# Patient Record
Sex: FEMALE | Race: AMERICAN INDIAN OR ALASKA NATIVE | Employment: FULL TIME | URBAN - METROPOLITAN AREA
[De-identification: names, ages, dates, MRNs, and addresses within clinical notes are randomized per-mention and may not be internally consistent; named-entity substitution may affect disease eponyms.]

---

## 2019-01-21 ENCOUNTER — HOSPITAL ENCOUNTER (OUTPATIENT)
Dept: PREADMISSION TESTING | Age: 54
Discharge: HOME | End: 2019-01-21
Payer: COMMERCIAL

## 2019-01-21 VITALS — BODY MASS INDEX: 34.55 KG/M2 | HEIGHT: 61 IN | WEIGHT: 183 LBS

## 2019-01-21 LAB
ALBUMIN SERPL-MCNC: 3.8 G/DL (ref 3.4–5)
ALBUMIN/GLOB SERPL: 1 {RATIO} (ref 0.8–1.7)
ALP SERPL-CCNC: 140 U/L (ref 45–117)
ALT SERPL-CCNC: 54 U/L (ref 13–56)
ANION GAP SERPL CALC-SCNC: 7 MMOL/L (ref 3–18)
AST SERPL-CCNC: 46 U/L (ref 15–37)
BACTERIA SPEC CULT: NORMAL
BILIRUB SERPL-MCNC: 0.5 MG/DL (ref 0.2–1)
BUN SERPL-MCNC: 8 MG/DL (ref 7–18)
BUN/CREAT SERPL: 11 (ref 12–20)
CALCIUM SERPL-MCNC: 9 MG/DL (ref 8.5–10.1)
CHLORIDE SERPL-SCNC: 100 MMOL/L (ref 100–108)
CO2 SERPL-SCNC: 28 MMOL/L (ref 21–32)
CREAT SERPL-MCNC: 0.71 MG/DL (ref 0.6–1.3)
ERYTHROCYTE [DISTWIDTH] IN BLOOD BY AUTOMATED COUNT: 12.9 % (ref 11.6–14.5)
EST. AVERAGE GLUCOSE BLD GHB EST-MCNC: 260 MG/DL
GLOBULIN SER CALC-MCNC: 3.8 G/DL (ref 2–4)
GLUCOSE SERPL-MCNC: 212 MG/DL (ref 74–99)
HBA1C MFR BLD: 10.7 % (ref 4.2–5.6)
HCT VFR BLD AUTO: 42.1 % (ref 35–45)
HGB BLD-MCNC: 13.7 G/DL (ref 12–16)
MCH RBC QN AUTO: 29.2 PG (ref 24–34)
MCHC RBC AUTO-ENTMCNC: 32.5 G/DL (ref 31–37)
MCV RBC AUTO: 89.8 FL (ref 74–97)
PLATELET # BLD AUTO: 210 K/UL (ref 135–420)
PMV BLD AUTO: 12.5 FL (ref 9.2–11.8)
POTASSIUM SERPL-SCNC: 4.2 MMOL/L (ref 3.5–5.5)
PROT SERPL-MCNC: 7.6 G/DL (ref 6.4–8.2)
RBC # BLD AUTO: 4.69 M/UL (ref 4.2–5.3)
SERVICE CMNT-IMP: NORMAL
SODIUM SERPL-SCNC: 135 MMOL/L (ref 136–145)
WBC # BLD AUTO: 6 K/UL (ref 4.6–13.2)

## 2019-01-21 PROCEDURE — 83036 HEMOGLOBIN GLYCOSYLATED A1C: CPT

## 2019-01-21 PROCEDURE — 87641 MR-STAPH DNA AMP PROBE: CPT

## 2019-01-21 PROCEDURE — 80053 COMPREHEN METABOLIC PANEL: CPT

## 2019-01-21 PROCEDURE — 93005 ELECTROCARDIOGRAM TRACING: CPT

## 2019-01-21 PROCEDURE — 85027 COMPLETE CBC AUTOMATED: CPT

## 2019-01-21 RX ORDER — PERPHENAZINE 16 MG
2 TABLET ORAL 2 TIMES DAILY
COMMUNITY

## 2019-01-21 RX ORDER — OMEPRAZOLE 20 MG/1
20 CAPSULE, DELAYED RELEASE ORAL DAILY
COMMUNITY

## 2019-01-21 RX ORDER — CEFAZOLIN SODIUM 2 G/50ML
2 SOLUTION INTRAVENOUS ONCE
Status: CANCELLED | OUTPATIENT
Start: 2019-02-05 | End: 2019-02-05

## 2019-01-21 RX ORDER — GABAPENTIN 100 MG/1
100 CAPSULE ORAL
COMMUNITY

## 2019-01-21 RX ORDER — ATORVASTATIN CALCIUM 20 MG/1
20 TABLET, FILM COATED ORAL DAILY
COMMUNITY

## 2019-01-21 RX ORDER — SODIUM CHLORIDE, SODIUM LACTATE, POTASSIUM CHLORIDE, CALCIUM CHLORIDE 600; 310; 30; 20 MG/100ML; MG/100ML; MG/100ML; MG/100ML
125 INJECTION, SOLUTION INTRAVENOUS CONTINUOUS
Status: CANCELLED | OUTPATIENT
Start: 2019-01-21

## 2019-01-21 NOTE — PERIOP NOTES
Patient aware and confirms she will get instructions on the use of her pump insulin from Dr. Randy Hwang before the surgery.

## 2019-01-22 LAB
ATRIAL RATE: 69 BPM
CALCULATED P AXIS, ECG09: 63 DEGREES
CALCULATED R AXIS, ECG10: -2 DEGREES
CALCULATED T AXIS, ECG11: 24 DEGREES
DIAGNOSIS, 93000: NORMAL
P-R INTERVAL, ECG05: 148 MS
Q-T INTERVAL, ECG07: 424 MS
QRS DURATION, ECG06: 82 MS
QTC CALCULATION (BEZET), ECG08: 454 MS
VENTRICULAR RATE, ECG03: 69 BPM

## 2019-01-28 NOTE — PERIOP NOTES
Endocrinology note reviewed and email sent to THE [facility] regarding being involved in patient's care while in hospital.

## 2019-01-28 NOTE — DIABETES MGMT
GLYCEMIC CONTROL PROGRESS NOTE: 
 
- Left message for pt to return call regarding hospital policies and procedures regarding home insulin pump use during surgery and hospitalization. Pt scheduled for decompression and fusion surgery with Dr. Ivan Nielsen on February 5, 2019. 
-left message on both mobile number and work number 
 
Vish Leary MS, RN, CDE Glycemic Control Team 
[phone] Pager [phone] (M-TH 8:00-4:30P) *After Hours pager [phone]

## 2019-01-28 NOTE — H&P
Patient Name:  Darell Medina 
YOB: 1965 Chief Complaint:  Right-sided lower back pain and right lower extremity pain. History of Chief Complaint:  Ms. Clemente Castro is a 79-year-old female who is being seen for right-sided lower back pain and right lower extremity pain. She occasionally has pain in the left leg. She has had these symptoms for six months after falling. She was seen at [facility] and had a CT scan. They suggested lumbar fusion. She would have to remove her bladder stimulator to have an MRI, and she seemed uncertain. The pain is constant. She has numbness in the bilateral lower extremities intermittently. There is no weakness. She occasionally has problems with her balance and says she is unable to do anything. She is able to walk less than one block. The pain is worse with climbing stairs and standing. She is unable to sleep. The patient has had no chiropractic care. Physical therapy was scheduled, but she did not attend as it was too expensive. She has had no injections. She has taken ibuprofen and Tylenol Arthritis to help with pain relief. She had lumbar spine surgery in 2011. She had a CT scan done at [facility] in November 2017. She did undergo a right PSIS injection on 06/18/18. She did get some relief with the injection, but she states all of a sudden she had a severe increase in her pain along lower back, right side worse than left, and down the right leg into her foot. She states that the PSIS injection only helped briefly. Since this sudden pain here recently she has had inability to do all of her activities of daily living. It is very painful for her to stand, bend, squat, or sit. She has been limping. She is unable to stand over 10 to 15 minutes at a time. She has been having spasms at night, especially in her back and her leg. She has been unable to sleep due to the pain and spasms. The more walking she does the more painful it is.  She has only been able to walk a half of a block at a time. She states her groin pain has been increasing significantly lately. She denies any numbness, weakness, or incontinence. She occasionally has some loss of balance upon standing. She says the epidurals do not really last very long, but because of her diabetes it also increases her blood sugars. She is quite worried about that. The epidural shots that she did have seemed to make the pain in her leg better for a couple of days. Past Medical/Surgical History:   
Disease/Disorder Type Date Side Surgery Date Side Comment Arthritis Depression Diabetes mellitus    Bladder stimulator implant Sleep apnea Surgery, lumbar spine 2011  Laminectomy L5, no hardware. Rotator cuff repair 2015 Carpal tunnel release 2017 bilateral   
 
Allergies:   
Ingredient Reaction Medication Name Comment MORPHINE throat swelling CODEINE-Percocet nausea/vomiting SULFA (SULFONAMIDE ANTIBIOTICS) TRAMADOL Jenita Rina Cipro Nausea / Vomiting Current Medications:   
Medication Directions  
sertraline 100 mg tablet take 1 tablet by oral route  every day Humulin R U-500 (Concentrated) Kwikpen 500 unit/mL (3 mL) subcutaneous inject by subcutaneous route as per insulin protocol Jardiance 25 mg tablet take 1 tablet by oral route  every day  
omeprazole take once a day Social History: SMOKING Status Tobacco Type Units Per Day Yrs Used Never smoker ALCOHOL There is no history of alcohol use. Family History:   
Disease Detail Family Member Age Cause of Death Comments Family history of Problem with anesthesia   N Asthma Father  N   
Cardiovascular disease Father  N Hypertension Father  N Cancer, bladder Mother  N Cancer, lung Father  N Diabetes mellitus Father  N Review of Systems:    Pertinent positives include joint pain, joint stiffness and numbness/tingling. Pertinent negatives include chills, fever, weight change, loss of balance and muscle weakness. Vitals: 
Date BP Pulse Temp (F) Resp. (per min.) Height (Total in.) Weight (lbs.) BMI  
11/14/2018     60.00  36.52  
01/29/2018     60.00  36.52 Physical Examination:  
Heart- RRR Lungs-CTA sonal Abdomen- +BS,soft,nontender Musculoskeletal:  There is mild tenderness around the paravertebral spinal muscles. Otherwise, the thoracolumbar spine has normal kyphosis, a normal appearance, and no scoliosis. There is full range of motion of the cervical, thoracic, and lumbar spine and of the hips, knees, and ankles. Straight leg raising and crossed straight leg raising tests are negative. Neurological:  There is no weakness in the thoracic, lumbar, or sacral spine or in the lower extremities or hips. Deep tendon reflexes are present and normal bilaterally. Ankle and knee jerks are normal with no clonus. Radiograph Examination:  AP, lateral, bilateral oblique, flexion and extension views of the lumbar spine were obtained and interpreted in the office 1/29/18 and reveal severe degenerative disc disease at L5-S1. An AP view of the pelvis was obtained and interpreted in the office  and reveals right hip osteoarthritis more than left hip osteoarthritis. Review of her CT scan [facility] 1/14/19 reveals L3 to S1 severe degenerative disc disease and spinal stenosis. Plan:  Ms. Gt Jarvis and EARL had a long discussion about the treatments for her lumbar spinal stenosis and degenerative disc disease including surgical intervention, the risks, and benefits as well as the different surgical approaches and decision making. We also discussed nonsurgical care for this condition including medications, injections, physical therapy, rehabilitation, activity modification, and brace utilization.  At this point, she would like to proceed with operative intervention having failed conservative care. We will plan for L3 to S1 decompression and fusion . The risks versus the benefits as well as the alternatives were fully explained to the patient. Risks include, but are not limited to, paralysis, death, heart attack, stroke, pulmonary embolism, deep vein thrombosis, infection, failure to relieve pain, increase in back or leg pain, reherniation of disc material, need for revision surgery, scarring, spinal fluid leak, bowel or bladder dysfunction, disease transmission, chronic graft site pain, instrumentation failure, pseudarthrosis, and the need for chronic ambulatory assist devices. The patient states full understanding of the risks and benefits and wishes to proceed.

## 2019-01-29 PROBLEM — M48.062 SPINAL STENOSIS OF LUMBAR REGION WITH NEUROGENIC CLAUDICATION: Status: ACTIVE | Noted: 2019-01-29

## 2019-01-29 PROBLEM — M51.26 HNP (HERNIATED NUCLEUS PULPOSUS), LUMBAR: Status: ACTIVE | Noted: 2019-01-29

## 2019-01-29 PROBLEM — M51.36 DDD (DEGENERATIVE DISC DISEASE), LUMBAR: Status: ACTIVE | Noted: 2019-01-29

## 2019-01-30 NOTE — DIABETES MGMT
INSULIN PUMP CONSULT/ Plan Of Care How long have you had diabetes? T2DM since 1990 How long have you had an insulin pump? Where did you learn how to use it? Endocrinologist office What is your blood glucose target? < 200 mg/dL How often do you usually test your blood glucose in a day? At least 4x/day What was your most recent A1C and date? January 2019 10.7% Who is your endocrinologist?      Dr. Haris Bañuelos When was your last visit to your endocrinologist?   January 2019 INSULIN PUMP Brand of pump and model #:   
Type of insulin used   U-500 insulin (5 x more concentrated than U-100) Type of infusion set   Does not know What are your basal rate(s)? 2.29 units/hr What is your sensitivity factor? 80 What is your insulin to carbohydrate ratio? 1:14 What is your total daily dose? Basal = 55 units Bolus = 200-300 units What conventional insulin dose do you use if you pump were inoperable? (\"off pump plan\") Do you often have hypoglycemia? Per Endocrinologist note & CGM data pt is NOT having any hypoglycemic events; pt reports feeling hypo when  mg/dL How do you treat hypoglycemia? Do you have any site problems?     no 
Do you feel able and confident to manage your pump while here? yes Who helps you manage your insulin pump when you are not able? Will follow up - pt returned call to this writer for insulin pump consult, during call pt needed to respond to work priorities unable to complete consult, including a review of the policies for the safe use of insulin pumps at THE [facility]; this writer able to speak with patient at later time and review THE [facility] policies Spoke with pt via phone for insulin pump consult r/t upcoming surgery scheduled with Dr. Kiel Kidd on 02/05/19.  The following topics were thoroughly discussed: 
   
Per THE [facility] policy for patient safety, the use of home insulin pumps is not permitted during surgery or diagnostic procedures (i.e. xray, ultrasound, MRI, etc). Per Dr. Randy Hwang recommendation you will be able to wear your pump during your surgical procedure. You will be able to bolus yourself if needed prior to going into surgery. Your blood sugar will be checked during the entire pre-op process and during the operation. Please bring extra of all supplies needed, including insulin. We ask that you communicate with the nursing staff at all times regarding your pump and notify your nurse before you bolus at any time so that the amount of insulin being delivered can be noted in your chart. Please come prepared to report your basal rates to your nurse as well as your carb ratio and your sensitivity factor. The pump agreement also states that if at any time it becomes unsafe for you to continue wearing your pump, you will agree to remove the pump if asked so by your nurse or doctor and let our medical team handle your insulin delivery via sub-q injection. Examples of this would be a change in your level of consciousness or level of awareness or an inability for you to competently operate the pump. If you have a family member that is able to operate your pump on your behalf they may do so if they are willing to stay with you at all times. Your safety and your recovery remain our primary goals and we look forward to having you with us. Fluoroscopy will be used during the surgery, you will be required to sign a waiver r/t possible damage to insulin pump. Your safety and your recovery remain our primary goals and we look forward to having you with us. Pt instructed to monitor BG more frequently the day of surgery prior to coming to [facility];  Pt verbalized understanding of all items discussed. Pt stated that she has no additional questions or concerns at this time. Contact information provided in the event pt needs to discuss anything further prior to surgery. - please see note from Dr. Froylan Vega.   Pt will need to wear insulin pump during surgery r/t severe insulin resistance (U-500 insulin) and hyperglycemia; this writer left message with Dr. John Hanson office regarding an alternative plan for GC during surgery (Kelvin Hughes) r/t use of fluoroscopy during surgery (damage to insulin pump) & possible dislodgement of cannula when pt is turned onto stomach; Addendum: 
- this writer spoke with Nathan Liz, nurse at Dr. John Hanson office. Dr. Marsha Mejia would like for pt to wear insulin pump during surgery vs insulin drip r/t high insulin needs of pt and severe insulin resistance. This writer did explain the possible risks of wearing pump during surgery including exposure to fluoroscopy and dislodgement of cannula. Tesfaye Palacios MS, RN, CDE Glycemic Control Team 
[phone] Pager [phone] (M-TH 8:00-4:30P) *After Hours pager [phone]

## 2019-01-30 NOTE — DIABETES MGMT
GLYCEMIC CONTROL PROGRESS NOTE: 
 
- Left message for pt to return call regarding hospital policies and procedures regarding home insulin pump use during surgery and hospitalization. Pt scheduled for decompression and fusion surgery with Dr. Chris Noriega on February 5, 2019. 
- left message on both mobile number and work number reminding pt of the importance of following Dr. Fozia Birmingham instructions regarding decrease in basal rate prior to surgery Junious All MS, RN, CDE Glycemic Control Team 
[phone] Pager [phone] (M-TH 8:00-4:30P) *After Hours pager [phone]

## 2019-01-31 PROBLEM — Z98.890 STATUS POST LUMBAR SURGERY: Status: ACTIVE | Noted: 2019-01-31

## 2019-02-04 ENCOUNTER — ANESTHESIA EVENT (OUTPATIENT)
Dept: SURGERY | Age: 54
DRG: 460 | End: 2019-02-04
Payer: COMMERCIAL

## 2019-02-04 NOTE — NURSE NAVIGATOR
Telephone call from [facility] in Diabetic Education . Patient is wearing a insulin pump. Pump must be left in place for entire periop experience per endocrinologist.  Ivy Wallace RN,charge nurse; Kenn Hernandez, Clinical Coordinator, and AVERY Ryan made aware of situation.

## 2019-02-04 NOTE — PERIOP NOTES
Received message from Heladio King that per Eloise Lam, diabetic educator, patient has insulin pump that needs to remain on and for the patient to roll to OR with the pump, per recommendation by patient's endocrinologist. Given the circumstaces,  staff in [facility] is encouraged to obtain waiver for the pump. Pre-op nurses Beni Hernandez And Alaina HUMMEL Notified.

## 2019-02-05 ENCOUNTER — ANESTHESIA (OUTPATIENT)
Dept: SURGERY | Age: 54
DRG: 460 | End: 2019-02-05
Payer: COMMERCIAL

## 2019-02-05 ENCOUNTER — HOSPITAL ENCOUNTER (INPATIENT)
Age: 54
LOS: 1 days | Discharge: HOME HEALTH | DRG: 460 | End: 2019-02-06
Attending: ORTHOPAEDIC SURGERY | Admitting: ORTHOPAEDIC SURGERY
Payer: COMMERCIAL

## 2019-02-05 ENCOUNTER — APPOINTMENT (OUTPATIENT)
Dept: GENERAL RADIOLOGY | Age: 54
DRG: 460 | End: 2019-02-05
Attending: ORTHOPAEDIC SURGERY
Payer: COMMERCIAL

## 2019-02-05 DIAGNOSIS — M48.062 SPINAL STENOSIS OF LUMBAR REGION WITH NEUROGENIC CLAUDICATION: Primary | ICD-10-CM

## 2019-02-05 PROBLEM — G47.30 SLEEP APNEA: Status: ACTIVE | Noted: 2019-02-05

## 2019-02-05 PROBLEM — J45.909 ASTHMA: Status: ACTIVE | Noted: 2019-02-05

## 2019-02-05 PROBLEM — I10 HTN (HYPERTENSION): Status: ACTIVE | Noted: 2019-02-05

## 2019-02-05 PROBLEM — E11.9 DIABETES MELLITUS TYPE 2, CONTROLLED (HCC): Status: ACTIVE | Noted: 2019-02-05

## 2019-02-05 PROBLEM — M48.061 SPINAL STENOSIS, LUMBAR: Status: ACTIVE | Noted: 2019-02-05

## 2019-02-05 LAB
ABO + RH BLD: NORMAL
BLOOD GROUP ANTIBODIES SERPL: NORMAL
GLUCOSE BLD STRIP.AUTO-MCNC: 102 MG/DL (ref 70–110)
GLUCOSE BLD STRIP.AUTO-MCNC: 114 MG/DL (ref 70–110)
GLUCOSE BLD STRIP.AUTO-MCNC: 116 MG/DL (ref 70–110)
GLUCOSE BLD STRIP.AUTO-MCNC: 121 MG/DL (ref 70–110)
GLUCOSE BLD STRIP.AUTO-MCNC: 123 MG/DL (ref 70–110)
GLUCOSE BLD STRIP.AUTO-MCNC: 131 MG/DL (ref 70–110)
GLUCOSE BLD STRIP.AUTO-MCNC: 132 MG/DL (ref 70–110)
GLUCOSE BLD STRIP.AUTO-MCNC: 140 MG/DL (ref 70–110)
GLUCOSE BLD STRIP.AUTO-MCNC: 142 MG/DL (ref 70–110)
GLUCOSE BLD STRIP.AUTO-MCNC: 149 MG/DL (ref 70–110)
GLUCOSE BLD STRIP.AUTO-MCNC: 173 MG/DL (ref 70–110)
GLUCOSE BLD STRIP.AUTO-MCNC: 189 MG/DL (ref 70–110)
GLUCOSE BLD STRIP.AUTO-MCNC: 226 MG/DL (ref 70–110)
GLUCOSE BLD STRIP.AUTO-MCNC: 243 MG/DL (ref 70–110)
GLUCOSE BLD STRIP.AUTO-MCNC: 274 MG/DL (ref 70–110)
GLUCOSE BLD STRIP.AUTO-MCNC: 290 MG/DL (ref 70–110)
SPECIMEN EXP DATE BLD: NORMAL

## 2019-02-05 PROCEDURE — 74011000250 HC RX REV CODE- 250

## 2019-02-05 PROCEDURE — C1713 ANCHOR/SCREW BN/BN,TIS/BN: HCPCS | Performed by: ORTHOPAEDIC SURGERY

## 2019-02-05 PROCEDURE — 77030032753 HC BIT DRL SLD SPFT -B: Performed by: ORTHOPAEDIC SURGERY

## 2019-02-05 PROCEDURE — 77030006643: Performed by: ANESTHESIOLOGY

## 2019-02-05 PROCEDURE — 36415 COLL VENOUS BLD VENIPUNCTURE: CPT

## 2019-02-05 PROCEDURE — 77030018836 HC SOL IRR NACL ICUM -A: Performed by: ORTHOPAEDIC SURGERY

## 2019-02-05 PROCEDURE — 74011250636 HC RX REV CODE- 250/636

## 2019-02-05 PROCEDURE — 76060000036 HC ANESTHESIA 2.5 TO 3 HR: Performed by: ORTHOPAEDIC SURGERY

## 2019-02-05 PROCEDURE — 77030013708 HC HNDPC SUC IRR PULS STRY –B: Performed by: ORTHOPAEDIC SURGERY

## 2019-02-05 PROCEDURE — 77010033678 HC OXYGEN DAILY

## 2019-02-05 PROCEDURE — 86900 BLOOD TYPING SEROLOGIC ABO: CPT

## 2019-02-05 PROCEDURE — 77030003028 HC SUT VCRL J&J -A: Performed by: ORTHOPAEDIC SURGERY

## 2019-02-05 PROCEDURE — 77030034849: Performed by: ORTHOPAEDIC SURGERY

## 2019-02-05 PROCEDURE — 65660000000 HC RM CCU STEPDOWN

## 2019-02-05 PROCEDURE — 77030020407 HC IV BLD WRMR ST 3M -A: Performed by: ANESTHESIOLOGY

## 2019-02-05 PROCEDURE — 0ST20ZZ RESECTION OF LUMBAR VERTEBRAL DISC, OPEN APPROACH: ICD-10-PCS | Performed by: ORTHOPAEDIC SURGERY

## 2019-02-05 PROCEDURE — 74011250636 HC RX REV CODE- 250/636: Performed by: ORTHOPAEDIC SURGERY

## 2019-02-05 PROCEDURE — 77030032490 HC SLV COMPR SCD KNE COVD -B: Performed by: ORTHOPAEDIC SURGERY

## 2019-02-05 PROCEDURE — 77030012406 HC DRN WND PENRS BARD -A: Performed by: ORTHOPAEDIC SURGERY

## 2019-02-05 PROCEDURE — 77030008462 HC STPLR SKN PROX J&J -A: Performed by: ORTHOPAEDIC SURGERY

## 2019-02-05 PROCEDURE — 74011250636 HC RX REV CODE- 250/636: Performed by: INTERNAL MEDICINE

## 2019-02-05 PROCEDURE — 76010000132 HC OR TIME 2.5 TO 3 HR: Performed by: ORTHOPAEDIC SURGERY

## 2019-02-05 PROCEDURE — 74011250637 HC RX REV CODE- 250/637: Performed by: PHYSICIAN ASSISTANT

## 2019-02-05 PROCEDURE — 74011250637 HC RX REV CODE- 250/637: Performed by: ANESTHESIOLOGY

## 2019-02-05 PROCEDURE — 77030020262 HC SOL INJ SOD CL 0.9% 100ML: Performed by: ORTHOPAEDIC SURGERY

## 2019-02-05 PROCEDURE — 0ST40ZZ RESECTION OF LUMBOSACRAL DISC, OPEN APPROACH: ICD-10-PCS | Performed by: ORTHOPAEDIC SURGERY

## 2019-02-05 PROCEDURE — 74011250636 HC RX REV CODE- 250/636: Performed by: ANESTHESIOLOGY

## 2019-02-05 PROCEDURE — 0SG3071 FUSION OF LUMBOSACRAL JOINT WITH AUTOLOGOUS TISSUE SUBSTITUTE, POSTERIOR APPROACH, POSTERIOR COLUMN, OPEN APPROACH: ICD-10-PCS | Performed by: ORTHOPAEDIC SURGERY

## 2019-02-05 PROCEDURE — 74011000250 HC RX REV CODE- 250: Performed by: ANESTHESIOLOGY

## 2019-02-05 PROCEDURE — 82962 GLUCOSE BLOOD TEST: CPT

## 2019-02-05 PROCEDURE — 77030020782 HC GWN BAIR PAWS FLX 3M -B: Performed by: ORTHOPAEDIC SURGERY

## 2019-02-05 PROCEDURE — 77030004402 HC BUR NEUR STRY -C: Performed by: ORTHOPAEDIC SURGERY

## 2019-02-05 PROCEDURE — 77030008683 HC TU ET CUF COVD -A: Performed by: ANESTHESIOLOGY

## 2019-02-05 PROCEDURE — 76210000017 HC OR PH I REC 1.5 TO 2 HR: Performed by: ORTHOPAEDIC SURGERY

## 2019-02-05 PROCEDURE — 74011000250 HC RX REV CODE- 250: Performed by: ORTHOPAEDIC SURGERY

## 2019-02-05 PROCEDURE — 77030003029 HC SUT VCRL J&J -B: Performed by: ORTHOPAEDIC SURGERY

## 2019-02-05 PROCEDURE — 77030008477 HC STYL SATN SLP COVD -A: Performed by: ANESTHESIOLOGY

## 2019-02-05 PROCEDURE — 74011000250 HC RX REV CODE- 250: Performed by: PHYSICIAN ASSISTANT

## 2019-02-05 PROCEDURE — 74011000258 HC RX REV CODE- 258: Performed by: PHYSICIAN ASSISTANT

## 2019-02-05 PROCEDURE — 0SG1071 FUSION OF 2 OR MORE LUMBAR VERTEBRAL JOINTS WITH AUTOLOGOUS TISSUE SUBSTITUTE, POSTERIOR APPROACH, POSTERIOR COLUMN, OPEN APPROACH: ICD-10-PCS | Performed by: ORTHOPAEDIC SURGERY

## 2019-02-05 DEVICE — SCREW SPNL L45MM OD7MM SLD GEN 3 PEDFUSE RESET: Type: IMPLANTABLE DEVICE | Site: SPINE LUMBAR | Status: FUNCTIONAL

## 2019-02-05 DEVICE — SCREW SPNL CRV 30-35 MM ADJ CRUX ASSY: Type: IMPLANTABLE DEVICE | Site: SPINE LUMBAR | Status: FUNCTIONAL

## 2019-02-05 DEVICE — IMPLANTABLE DEVICE: Type: IMPLANTABLE DEVICE | Site: SPINE LUMBAR | Status: FUNCTIONAL

## 2019-02-05 DEVICE — ROD SPNL L100MM OD5.5MM LORDTC PEDFUSE: Type: IMPLANTABLE DEVICE | Site: SPINE LUMBAR | Status: FUNCTIONAL

## 2019-02-05 DEVICE — SCREW SPNL L50MM OD7MM SLD GEN 3 PEDFUSE RESET: Type: IMPLANTABLE DEVICE | Site: SPINE LUMBAR | Status: FUNCTIONAL

## 2019-02-05 DEVICE — SET SCR SPNL L5-7MM PEDFUSE: Type: IMPLANTABLE DEVICE | Site: SPINE LUMBAR | Status: FUNCTIONAL

## 2019-02-05 RX ORDER — DEXTROSE, SODIUM CHLORIDE, SODIUM LACTATE, POTASSIUM CHLORIDE, AND CALCIUM CHLORIDE 5; .6; .31; .03; .02 G/100ML; G/100ML; G/100ML; G/100ML; G/100ML
100 INJECTION, SOLUTION INTRAVENOUS CONTINUOUS
Status: CANCELLED | OUTPATIENT
Start: 2019-02-05

## 2019-02-05 RX ORDER — DEXTROSE, SODIUM CHLORIDE, SODIUM LACTATE, POTASSIUM CHLORIDE, AND CALCIUM CHLORIDE 5; .6; .31; .03; .02 G/100ML; G/100ML; G/100ML; G/100ML; G/100ML
100 INJECTION, SOLUTION INTRAVENOUS CONTINUOUS
Status: DISCONTINUED | OUTPATIENT
Start: 2019-02-05 | End: 2019-02-05 | Stop reason: HOSPADM

## 2019-02-05 RX ORDER — SODIUM CHLORIDE, SODIUM LACTATE, POTASSIUM CHLORIDE, CALCIUM CHLORIDE 600; 310; 30; 20 MG/100ML; MG/100ML; MG/100ML; MG/100ML
150 INJECTION, SOLUTION INTRAVENOUS CONTINUOUS
Status: DISCONTINUED | OUTPATIENT
Start: 2019-02-05 | End: 2019-02-05 | Stop reason: HOSPADM

## 2019-02-05 RX ORDER — SODIUM CHLORIDE 0.9 % (FLUSH) 0.9 %
5-40 SYRINGE (ML) INJECTION EVERY 8 HOURS
Status: DISCONTINUED | OUTPATIENT
Start: 2019-02-05 | End: 2019-02-05 | Stop reason: HOSPADM

## 2019-02-05 RX ORDER — HYDROMORPHONE HYDROCHLORIDE 2 MG/1
2 TABLET ORAL
Status: DISCONTINUED | OUTPATIENT
Start: 2019-02-05 | End: 2019-02-05

## 2019-02-05 RX ORDER — EPHEDRINE SULFATE/0.9% NACL/PF 25 MG/5 ML
SYRINGE (ML) INTRAVENOUS AS NEEDED
Status: DISCONTINUED | OUTPATIENT
Start: 2019-02-05 | End: 2019-02-05 | Stop reason: HOSPADM

## 2019-02-05 RX ORDER — ACETAMINOPHEN 325 MG/1
650 TABLET ORAL
Status: DISCONTINUED | OUTPATIENT
Start: 2019-02-05 | End: 2019-02-06 | Stop reason: HOSPADM

## 2019-02-05 RX ORDER — NALOXONE HYDROCHLORIDE 0.4 MG/ML
0.1 INJECTION, SOLUTION INTRAMUSCULAR; INTRAVENOUS; SUBCUTANEOUS AS NEEDED
Status: DISCONTINUED | OUTPATIENT
Start: 2019-02-05 | End: 2019-02-06 | Stop reason: HOSPADM

## 2019-02-05 RX ORDER — FENTANYL CITRATE 50 UG/ML
INJECTION, SOLUTION INTRAMUSCULAR; INTRAVENOUS AS NEEDED
Status: DISCONTINUED | OUTPATIENT
Start: 2019-02-05 | End: 2019-02-05 | Stop reason: HOSPADM

## 2019-02-05 RX ORDER — GLYCOPYRROLATE 0.2 MG/ML
INJECTION INTRAMUSCULAR; INTRAVENOUS AS NEEDED
Status: DISCONTINUED | OUTPATIENT
Start: 2019-02-05 | End: 2019-02-05 | Stop reason: HOSPADM

## 2019-02-05 RX ORDER — DIPHENHYDRAMINE HCL 25 MG
25 CAPSULE ORAL
Status: DISCONTINUED | OUTPATIENT
Start: 2019-02-05 | End: 2019-02-06 | Stop reason: HOSPADM

## 2019-02-05 RX ORDER — SODIUM CHLORIDE 0.9 % (FLUSH) 0.9 %
5-40 SYRINGE (ML) INJECTION EVERY 8 HOURS
Status: DISCONTINUED | OUTPATIENT
Start: 2019-02-05 | End: 2019-02-06 | Stop reason: HOSPADM

## 2019-02-05 RX ORDER — ATORVASTATIN CALCIUM 20 MG/1
20 TABLET, FILM COATED ORAL DAILY
Status: DISCONTINUED | OUTPATIENT
Start: 2019-02-06 | End: 2019-02-06 | Stop reason: HOSPADM

## 2019-02-05 RX ORDER — CEFAZOLIN SODIUM 2 G/50ML
2 SOLUTION INTRAVENOUS ONCE
Status: COMPLETED | OUTPATIENT
Start: 2019-02-05 | End: 2019-02-05

## 2019-02-05 RX ORDER — NALOXONE HYDROCHLORIDE 0.4 MG/ML
0.1 INJECTION, SOLUTION INTRAMUSCULAR; INTRAVENOUS; SUBCUTANEOUS AS NEEDED
Status: DISCONTINUED | OUTPATIENT
Start: 2019-02-05 | End: 2019-02-05 | Stop reason: HOSPADM

## 2019-02-05 RX ORDER — GABAPENTIN 100 MG/1
100 CAPSULE ORAL
Status: DISCONTINUED | OUTPATIENT
Start: 2019-02-05 | End: 2019-02-06 | Stop reason: HOSPADM

## 2019-02-05 RX ORDER — KETAMINE HYDROCHLORIDE 10 MG/ML
INJECTION, SOLUTION INTRAMUSCULAR; INTRAVENOUS AS NEEDED
Status: DISCONTINUED | OUTPATIENT
Start: 2019-02-05 | End: 2019-02-05 | Stop reason: HOSPADM

## 2019-02-05 RX ORDER — DEXMEDETOMIDINE HYDROCHLORIDE 4 UG/ML
INJECTION, SOLUTION INTRAVENOUS AS NEEDED
Status: DISCONTINUED | OUTPATIENT
Start: 2019-02-05 | End: 2019-02-05 | Stop reason: HOSPADM

## 2019-02-05 RX ORDER — SCOLOPAMINE TRANSDERMAL SYSTEM 1 MG/1
1 PATCH, EXTENDED RELEASE TRANSDERMAL ONCE
Status: DISCONTINUED | OUTPATIENT
Start: 2019-02-05 | End: 2019-02-06 | Stop reason: HOSPADM

## 2019-02-05 RX ORDER — INSULIN LISPRO 100 [IU]/ML
INJECTION, SOLUTION INTRAVENOUS; SUBCUTANEOUS
Status: DISCONTINUED | OUTPATIENT
Start: 2019-02-06 | End: 2019-02-06

## 2019-02-05 RX ORDER — ALBUTEROL SULFATE 0.83 MG/ML
2.5 SOLUTION RESPIRATORY (INHALATION) AS NEEDED
Status: DISCONTINUED | OUTPATIENT
Start: 2019-02-05 | End: 2019-02-05 | Stop reason: HOSPADM

## 2019-02-05 RX ORDER — DOCUSATE SODIUM 100 MG/1
100 CAPSULE, LIQUID FILLED ORAL 2 TIMES DAILY
Status: DISCONTINUED | OUTPATIENT
Start: 2019-02-05 | End: 2019-02-06 | Stop reason: HOSPADM

## 2019-02-05 RX ORDER — OMEPRAZOLE 20 MG/1
20 CAPSULE, DELAYED RELEASE ORAL DAILY
Status: DISCONTINUED | OUTPATIENT
Start: 2019-02-06 | End: 2019-02-06 | Stop reason: HOSPADM

## 2019-02-05 RX ORDER — PROPOFOL 10 MG/ML
INJECTION, EMULSION INTRAVENOUS AS NEEDED
Status: DISCONTINUED | OUTPATIENT
Start: 2019-02-05 | End: 2019-02-05 | Stop reason: HOSPADM

## 2019-02-05 RX ORDER — PHENYLEPHRINE HCL IN 0.9% NACL 1 MG/10 ML
SYRINGE (ML) INTRAVENOUS AS NEEDED
Status: DISCONTINUED | OUTPATIENT
Start: 2019-02-05 | End: 2019-02-05 | Stop reason: HOSPADM

## 2019-02-05 RX ORDER — CEFAZOLIN SODIUM 2 G/50ML
2 SOLUTION INTRAVENOUS EVERY 8 HOURS
Status: DISCONTINUED | OUTPATIENT
Start: 2019-02-05 | End: 2019-02-06 | Stop reason: HOSPADM

## 2019-02-05 RX ORDER — SODIUM CHLORIDE 0.9 % (FLUSH) 0.9 %
5-40 SYRINGE (ML) INJECTION AS NEEDED
Status: DISCONTINUED | OUTPATIENT
Start: 2019-02-05 | End: 2019-02-05 | Stop reason: HOSPADM

## 2019-02-05 RX ORDER — SERTRALINE HYDROCHLORIDE 50 MG/1
50 TABLET, FILM COATED ORAL DAILY
Status: DISCONTINUED | OUTPATIENT
Start: 2019-02-06 | End: 2019-02-06 | Stop reason: HOSPADM

## 2019-02-05 RX ORDER — ONDANSETRON 4 MG/1
4 TABLET, ORALLY DISINTEGRATING ORAL
Status: DISCONTINUED | OUTPATIENT
Start: 2019-02-05 | End: 2019-02-06 | Stop reason: HOSPADM

## 2019-02-05 RX ORDER — MAGNESIUM SULFATE 100 %
4 CRYSTALS MISCELLANEOUS AS NEEDED
Status: DISCONTINUED | OUTPATIENT
Start: 2019-02-05 | End: 2019-02-05 | Stop reason: HOSPADM

## 2019-02-05 RX ORDER — PREGABALIN 100 MG/1
100 CAPSULE ORAL
Status: COMPLETED | OUTPATIENT
Start: 2019-02-05 | End: 2019-02-05

## 2019-02-05 RX ORDER — TRAMADOL HYDROCHLORIDE 50 MG/1
100 TABLET ORAL 2 TIMES DAILY
Status: DISCONTINUED | OUTPATIENT
Start: 2019-02-06 | End: 2019-02-06 | Stop reason: HOSPADM

## 2019-02-05 RX ORDER — FENTANYL CITRATE 50 UG/ML
25 INJECTION, SOLUTION INTRAMUSCULAR; INTRAVENOUS AS NEEDED
Status: DISCONTINUED | OUTPATIENT
Start: 2019-02-05 | End: 2019-02-05 | Stop reason: HOSPADM

## 2019-02-05 RX ORDER — SODIUM CHLORIDE 0.9 % (FLUSH) 0.9 %
5-40 SYRINGE (ML) INJECTION AS NEEDED
Status: DISCONTINUED | OUTPATIENT
Start: 2019-02-05 | End: 2019-02-06 | Stop reason: HOSPADM

## 2019-02-05 RX ORDER — HYDROMORPHONE HYDROCHLORIDE 4 MG/1
4 TABLET ORAL
Status: DISCONTINUED | OUTPATIENT
Start: 2019-02-05 | End: 2019-02-05

## 2019-02-05 RX ORDER — FENTANYL CITRATE 50 UG/ML
25 INJECTION, SOLUTION INTRAMUSCULAR; INTRAVENOUS ONCE
Status: COMPLETED | OUTPATIENT
Start: 2019-02-05 | End: 2019-02-05

## 2019-02-05 RX ORDER — FENTANYL CITRATE-0.9 % NACL/PF 900MCG/30
PATIENT CONTROLLED ANALGESIA VIAL INJECTION
Status: DISCONTINUED | OUTPATIENT
Start: 2019-02-05 | End: 2019-02-05

## 2019-02-05 RX ORDER — ONDANSETRON 2 MG/ML
4 INJECTION INTRAMUSCULAR; INTRAVENOUS
Status: DISCONTINUED | OUTPATIENT
Start: 2019-02-05 | End: 2019-02-06 | Stop reason: HOSPADM

## 2019-02-05 RX ORDER — LUBIPROSTONE 24 UG/1
24 CAPSULE, GELATIN COATED ORAL 2 TIMES DAILY WITH MEALS
Status: DISCONTINUED | OUTPATIENT
Start: 2019-02-06 | End: 2019-02-06 | Stop reason: HOSPADM

## 2019-02-05 RX ORDER — SODIUM CHLORIDE, SODIUM LACTATE, POTASSIUM CHLORIDE, CALCIUM CHLORIDE 600; 310; 30; 20 MG/100ML; MG/100ML; MG/100ML; MG/100ML
125 INJECTION, SOLUTION INTRAVENOUS CONTINUOUS
Status: DISCONTINUED | OUTPATIENT
Start: 2019-02-05 | End: 2019-02-06 | Stop reason: HOSPADM

## 2019-02-05 RX ORDER — DIPHENHYDRAMINE HYDROCHLORIDE 50 MG/ML
12.5 INJECTION, SOLUTION INTRAMUSCULAR; INTRAVENOUS
Status: DISCONTINUED | OUTPATIENT
Start: 2019-02-05 | End: 2019-02-05 | Stop reason: HOSPADM

## 2019-02-05 RX ORDER — ONDANSETRON 2 MG/ML
INJECTION INTRAMUSCULAR; INTRAVENOUS AS NEEDED
Status: DISCONTINUED | OUTPATIENT
Start: 2019-02-05 | End: 2019-02-05 | Stop reason: HOSPADM

## 2019-02-05 RX ORDER — DIAZEPAM 5 MG/1
5 TABLET ORAL
Status: DISCONTINUED | OUTPATIENT
Start: 2019-02-05 | End: 2019-02-06 | Stop reason: HOSPADM

## 2019-02-05 RX ORDER — DEXTROSE, SODIUM CHLORIDE, SODIUM LACTATE, POTASSIUM CHLORIDE, AND CALCIUM CHLORIDE 5; .6; .31; .03; .02 G/100ML; G/100ML; G/100ML; G/100ML; G/100ML
INJECTION, SOLUTION INTRAVENOUS
Status: DISCONTINUED | OUTPATIENT
Start: 2019-02-05 | End: 2019-02-05 | Stop reason: HOSPADM

## 2019-02-05 RX ORDER — MIDAZOLAM HYDROCHLORIDE 1 MG/ML
INJECTION, SOLUTION INTRAMUSCULAR; INTRAVENOUS AS NEEDED
Status: DISCONTINUED | OUTPATIENT
Start: 2019-02-05 | End: 2019-02-05 | Stop reason: HOSPADM

## 2019-02-05 RX ORDER — LISINOPRIL 5 MG/1
10 TABLET ORAL DAILY
Status: DISCONTINUED | OUTPATIENT
Start: 2019-02-06 | End: 2019-02-06 | Stop reason: HOSPADM

## 2019-02-05 RX ORDER — LIDOCAINE HYDROCHLORIDE 20 MG/ML
INJECTION, SOLUTION EPIDURAL; INFILTRATION; INTRACAUDAL; PERINEURAL AS NEEDED
Status: DISCONTINUED | OUTPATIENT
Start: 2019-02-05 | End: 2019-02-05 | Stop reason: HOSPADM

## 2019-02-05 RX ORDER — DEXTROSE 50 % IN WATER (D50W) INTRAVENOUS SYRINGE
10
Status: COMPLETED | OUTPATIENT
Start: 2019-02-05 | End: 2019-02-05

## 2019-02-05 RX ORDER — ONDANSETRON 2 MG/ML
4 INJECTION INTRAMUSCULAR; INTRAVENOUS ONCE
Status: DISCONTINUED | OUTPATIENT
Start: 2019-02-05 | End: 2019-02-05 | Stop reason: HOSPADM

## 2019-02-05 RX ORDER — ACETAMINOPHEN 500 MG
1000 TABLET ORAL
Status: COMPLETED | OUTPATIENT
Start: 2019-02-05 | End: 2019-02-05

## 2019-02-05 RX ORDER — DEXTROSE 50 % IN WATER (D50W) INTRAVENOUS SYRINGE
25-50 AS NEEDED
Status: DISCONTINUED | OUTPATIENT
Start: 2019-02-05 | End: 2019-02-05 | Stop reason: HOSPADM

## 2019-02-05 RX ORDER — ROCURONIUM BROMIDE 10 MG/ML
INJECTION, SOLUTION INTRAVENOUS AS NEEDED
Status: DISCONTINUED | OUTPATIENT
Start: 2019-02-05 | End: 2019-02-05 | Stop reason: HOSPADM

## 2019-02-05 RX ADMIN — PREGABALIN 100 MG: 100 CAPSULE ORAL at 09:23

## 2019-02-05 RX ADMIN — KETAMINE HYDROCHLORIDE 10 MG: 10 INJECTION, SOLUTION INTRAMUSCULAR; INTRAVENOUS at 16:21

## 2019-02-05 RX ADMIN — SODIUM CHLORIDE, SODIUM LACTATE, POTASSIUM CHLORIDE, AND CALCIUM CHLORIDE: 600; 310; 30; 20 INJECTION, SOLUTION INTRAVENOUS at 17:06

## 2019-02-05 RX ADMIN — Medication 100 MCG: at 15:26

## 2019-02-05 RX ADMIN — Medication 5 MG: at 17:17

## 2019-02-05 RX ADMIN — TRANEXAMIC ACID 1 G: 100 INJECTION, SOLUTION INTRAVENOUS at 15:34

## 2019-02-05 RX ADMIN — Medication 10 MG: at 16:11

## 2019-02-05 RX ADMIN — CEFAZOLIN SODIUM 2 G: 2 SOLUTION INTRAVENOUS at 15:17

## 2019-02-05 RX ADMIN — ONDANSETRON 4 MG: 4 TABLET, ORALLY DISINTEGRATING ORAL at 20:48

## 2019-02-05 RX ADMIN — KETAMINE HYDROCHLORIDE 10 MG: 10 INJECTION, SOLUTION INTRAMUSCULAR; INTRAVENOUS at 16:17

## 2019-02-05 RX ADMIN — DEXMEDETOMIDINE HYDROCHLORIDE 8 MCG: 4 INJECTION, SOLUTION INTRAVENOUS at 15:58

## 2019-02-05 RX ADMIN — Medication 10 MG: at 16:36

## 2019-02-05 RX ADMIN — SODIUM CHLORIDE, SODIUM LACTATE, POTASSIUM CHLORIDE, AND CALCIUM CHLORIDE 125 ML/HR: 600; 310; 30; 20 INJECTION, SOLUTION INTRAVENOUS at 13:30

## 2019-02-05 RX ADMIN — LIDOCAINE HYDROCHLORIDE 60 MG: 20 INJECTION, SOLUTION EPIDURAL; INFILTRATION; INTRACAUDAL; PERINEURAL at 15:12

## 2019-02-05 RX ADMIN — GABAPENTIN 100 MG: 100 CAPSULE ORAL at 21:43

## 2019-02-05 RX ADMIN — DOCUSATE SODIUM 100 MG: 100 CAPSULE, LIQUID FILLED ORAL at 21:43

## 2019-02-05 RX ADMIN — ONDANSETRON 4 MG: 2 INJECTION INTRAMUSCULAR; INTRAVENOUS at 15:05

## 2019-02-05 RX ADMIN — SODIUM CHLORIDE, SODIUM LACTATE, POTASSIUM CHLORIDE, CALCIUM CHLORIDE, AND DEXTROSE MONOHYDRATE 100 ML/HR: 600; 310; 30; 20; 5 INJECTION, SOLUTION INTRAVENOUS at 11:08

## 2019-02-05 RX ADMIN — ROCURONIUM BROMIDE 50 MG: 10 INJECTION, SOLUTION INTRAVENOUS at 15:12

## 2019-02-05 RX ADMIN — GLYCOPYRROLATE 0.2 MG: 0.2 INJECTION INTRAMUSCULAR; INTRAVENOUS at 15:03

## 2019-02-05 RX ADMIN — KETAMINE HYDROCHLORIDE 20 MG: 10 INJECTION, SOLUTION INTRAMUSCULAR; INTRAVENOUS at 15:55

## 2019-02-05 RX ADMIN — PROPOFOL 150 MG: 10 INJECTION, EMULSION INTRAVENOUS at 15:12

## 2019-02-05 RX ADMIN — Medication 10 MG: at 16:53

## 2019-02-05 RX ADMIN — ROCURONIUM BROMIDE 10 MG: 10 INJECTION, SOLUTION INTRAVENOUS at 16:59

## 2019-02-05 RX ADMIN — ONDANSETRON 4 MG: 2 INJECTION INTRAMUSCULAR; INTRAVENOUS at 17:14

## 2019-02-05 RX ADMIN — SODIUM CHLORIDE, SODIUM LACTATE, POTASSIUM CHLORIDE, AND CALCIUM CHLORIDE 125 ML/HR: 600; 310; 30; 20 INJECTION, SOLUTION INTRAVENOUS at 08:53

## 2019-02-05 RX ADMIN — Medication 100 MCG: at 15:48

## 2019-02-05 RX ADMIN — ACETAMINOPHEN 1000 MG: 500 TABLET, FILM COATED ORAL at 09:23

## 2019-02-05 RX ADMIN — DEXTROSE MONOHYDRATE 5 G: 25 INJECTION, SOLUTION INTRAVENOUS at 14:04

## 2019-02-05 RX ADMIN — ONDANSETRON 4 MG: 2 INJECTION INTRAMUSCULAR; INTRAVENOUS at 21:42

## 2019-02-05 RX ADMIN — DEXMEDETOMIDINE HYDROCHLORIDE 8 MCG: 4 INJECTION, SOLUTION INTRAVENOUS at 16:17

## 2019-02-05 RX ADMIN — DEXTROSE, SODIUM CHLORIDE, SODIUM LACTATE, POTASSIUM CHLORIDE, AND CALCIUM CHLORIDE: 5; .6; .31; .03; .02 INJECTION, SOLUTION INTRAVENOUS at 10:54

## 2019-02-05 RX ADMIN — ROCURONIUM BROMIDE 10 MG: 10 INJECTION, SOLUTION INTRAVENOUS at 15:59

## 2019-02-05 RX ADMIN — Medication 15 MG: at 16:09

## 2019-02-05 RX ADMIN — ROCURONIUM BROMIDE 10 MG: 10 INJECTION, SOLUTION INTRAVENOUS at 16:09

## 2019-02-05 RX ADMIN — FENTANYL CITRATE 25 MCG: 50 INJECTION, SOLUTION INTRAMUSCULAR; INTRAVENOUS at 21:43

## 2019-02-05 RX ADMIN — KETAMINE HYDROCHLORIDE 20 MG: 10 INJECTION, SOLUTION INTRAMUSCULAR; INTRAVENOUS at 15:20

## 2019-02-05 RX ADMIN — MIDAZOLAM HYDROCHLORIDE 2 MG: 1 INJECTION, SOLUTION INTRAMUSCULAR; INTRAVENOUS at 15:03

## 2019-02-05 RX ADMIN — FENTANYL CITRATE 100 MCG: 50 INJECTION, SOLUTION INTRAMUSCULAR; INTRAVENOUS at 15:10

## 2019-02-05 RX ADMIN — SODIUM CHLORIDE, SODIUM LACTATE, POTASSIUM CHLORIDE, AND CALCIUM CHLORIDE: 600; 310; 30; 20 INJECTION, SOLUTION INTRAVENOUS at 16:00

## 2019-02-05 RX ADMIN — KETAMINE HYDROCHLORIDE 10 MG: 10 INJECTION, SOLUTION INTRAMUSCULAR; INTRAVENOUS at 17:01

## 2019-02-05 NOTE — PERIOP NOTES
Spoke with Heather Gerard, RN, she spoke with insulin pump  regarding protection of pump. There are no instructions regarding how to shield pump during surgery to protect from radiology, etc. Heather Gerard will communicate with Ronan Mary RN, Dr Tucker Garcia and Faisal Nolasco RN regarding plan to manage patient's blood sugars during procedure.

## 2019-02-05 NOTE — NURSE NAVIGATOR
Patient has a bladder stimulator. Spoke with [employer] rep, [employer]. Okay to proceed with spinal procedure. He will call the patient on Wednesday to set up an appointment with Dr. Mic Peter office to reprogram and turn stimulator back on. \"The patient or device will not be harmed by proceeding with the case\" per [employer], [employer] rep. Information given to Dr. Mary Ervin and Dr. Renzo Chaudhari. Rep. Telephone number is [phone].

## 2019-02-05 NOTE — DIABETES MGMT
NUTRITION / GLYCEMIC CONTROL PLAN OF CARE 
   
- known h/o severe insulin resistant poorly managed T2DM, HbA1c not within recommended range for age + comorbids on U-500 (5x more concentrated than U-100) [employer] T-Slim insulin pump - per Endocrinologist note pt is having BGs in the 300-500s without hypoglycemia, as an outpatient she requires more than 450 units of insulin a day and remains hyperglycemia 
- total daily basal rate = 55 units of U-500 (this is ~ 300 units of U-100) - total daily bolus varies ~ 200-300 units for a TDD of ~ 500-600 units of U-500 insulin (7141-9785 units of U-100) - per OR team consultation, do believe pt would benefit from frequent BG monitoring while in surgery every 15-30 minutes 
- this writer spoke with [employer] regarding exposure to radiation--not recommended for insulin pump to be exposed to radiation as pumps were not tested under radiation conditions, recommend verify pump functionality after surgery by calling [employer] 
- recommend Q1 hour BG monitoring after surgery once on patient care unit to monitor BG trends & D5 in IVF while pt is not eating - pt able to change pump setting to temp basal rate of 80% to avoid lows per Endocrinologist recommendation, basal will change back to 100% on 2/6 11AM 
Addendum: 
- per OR staff, insulin pump was shielded during the entire surgery and is functioning Diabetes Management:  
 - recommend: monitor BG Q 1 hour for trends 
- goals: *BG will be in target range of  mg/dl (non-ICU) by 2/13 *PO intake will be 75% of meals offered by 2/13 *Pt will follow up with OP PCP for Diabetes Management by 3/31 
- TDD: insulin pump  
- BG range: 114-226 mg/dl - Hypo: no 
- BG in target range (non-ICU: <140; ICU<180): [] Yes  [x] No 
- Steroids:  no 
Current Insulin regimen:  
Tandem T-Slim insulin pump with U-500 insulin (5x more concentrated than U-100) - see insulin pump consult Home medication/insulin regimen: 
[employer] T-Slim insulin pump with U-500 insulin (5x more concentrated than U-100) Recent Glucose Results:  
Lab Results Component Value Date/Time GLUCPOC 189 (H) 02/05/2019 05:59 PM  
 GLUCPOC 173 (H) 02/05/2019 05:04 PM  
 GLUCPOC 149 (H) 02/05/2019 04:17 PM  
 
 
Adequate glycemic control PTA:  [] Yes  [x] No 
 
HbA1c: equivalent  to ave BGlucose of 260 mg/dl for 2-3 months prior to admission Lab Results Component Value Date/Time Hemoglobin A1c 10.7 (H) 01/21/2019 07:30 AM  
 
Diet:  
Active Orders There are no active orders of the following type(s): Diet. Bradley Francis MS, RN, CDE Glycemic Control Team 
[phone] Pager [phone] (M-TH 8:00-4:30P) *After Hours pager [phone]

## 2019-02-05 NOTE — DIABETES MGMT
GLYCEMIC CONTROL PROGRESS NOTE: 
 
- known h/o severe insulin resistant poorly managed T2DM, HbA1c not within recommended range for age + comorbids on U-500 (5x more concentrated than U-100) [employer]andem T-Slim insulin pump - per Endocrinologist note pt is having BGs in the 300-500s without hypoglycemia, as an outpatient she requires more than 450 units of insulin a day and remains hyperglycemia 
- total daily basal rate = 55 units of U-500 (this is ~ 300 units of U-100) - total daily bolus varies ~ 200-300 units for a TDD of ~ 500-600 units of U-500 insulin (1707-4060 units of U-100) - per OR team consultation, do believe pt would benefit from frequent BG monitoring while in surgery every 15-30 minutes 
- this writer spoke with [employer] regarding exposure to radiation--not recommended for insulin pump to be exposed to radiation as pumps were not tested under radiation conditions, recommend verify pump functionality after surgery by calling [employer] 
- recommend Q1 hour BG monitoring after surgery once on patient care unit to monitor BG trends Eleno Lubin MS, RN, CDE Glycemic Control Team 
[phone] Pager [phone] (M-TH 8:00-4:30P) *After Hours pager [phone]

## 2019-02-05 NOTE — PERIOP NOTES
Karen CARRIZALES CRNA requested blood sugar stick before rolling. BS result of 140, notified Indigo Lott.

## 2019-02-05 NOTE — PERIOP NOTES
Primary Nurse Edel Pete RN and LUCIO Andrew RN performed a dual skin assessment on this patient Reviewed PTA medication list with patient/caregiver and patient/caregiver denies any additional medications.

## 2019-02-05 NOTE — PERIOP NOTES
Spoke with Gerald Jackson RN.  She will contact insulin pump  to determine how to protect pump during surgery per Dr. Dhillon request.

## 2019-02-05 NOTE — PROGRESS NOTES
Verbal order received from Dr. Pietro Meehan related to current blood glucose. Repeat back to decrease patient basal rate to 80% and increase D5LR to 100 cc/hr. Patient able to change basal rate to 80% with assistance and will be in place x 24 hours.

## 2019-02-05 NOTE — ANESTHESIA PREPROCEDURE EVALUATION
Anesthetic History PONV Review of Systems / Medical History Patient summary reviewed, nursing notes reviewed and pertinent labs reviewed Pulmonary Sleep apnea: CPAP Asthma : well controlled Neuro/Psych Within defined limits Cardiovascular Hypertension Exercise tolerance: >4 METS 
  
GI/Hepatic/Renal 
  
GERD Endo/Other Diabetes: poorly controlled, using insulin Arthritis Comments: Insulin pump on as per endocrinologist Other Findings Physical Exam 
 
Airway Mallampati: III 
TM Distance: 4 - 6 cm Neck ROM: normal range of motion Mouth opening: Normal 
 
 Cardiovascular Regular rate and rhythm,  S1 and S2 normal,  no murmur, click, rub, or gallop Dental 
No notable dental hx Pulmonary Breath sounds clear to auscultation Abdominal 
GI exam deferred Other Findings Anesthetic Plan ASA: 3 Anesthesia type: general 
 
 
 
 
Induction: Intravenous Anesthetic plan and risks discussed with: Patient

## 2019-02-05 NOTE — INTERVAL H&P NOTE
H&P Update: 
Lilian Friend was seen and examined. History and physical has been reviewed. The patient has been examined.  There have been no significant clinical changes since the completion of the originally dated History and Physical. 
 
Signed By: Radha Bailey MD   
 February 5, 2019 9:25 AM

## 2019-02-05 NOTE — PERIOP NOTES
Laverne Sun, RN cam in to speak with patient about plan to manage blood sugars in the event her pump should come dislodged during surgery. Patient agreeable.

## 2019-02-05 NOTE — OP NOTES
Patient: Rene Ortiz MRN: [id number]  SSN: [id number]    YOB: 1965  Age: 46 y.o. Sex: female        Date of Procedure: 2/5/2019   Preoperative Diagnosis: OBESITY,LUMBAGO,LUMBAR RADICULOPATHY,LUMBAR STENOSIS,LUMBOSACRAL STENOSIS,HYPERTENSION,DIABETIC TYPE I  Postoperative Diagnosis: OBESITY,LUMBAGO,LUMBAR RADICULOPATHY,LUMBAR STENOSIS,LUMBOSACRAL STENOSIS,HYPERTENSION,DIABETIC TYPE I    Procedure: Procedure(s):  L3-S1 REVISION  DECOMPRESSION AND FUSION W/C-ARM, \"SPEC POP\"  Surgeon(s) and Role:     * Larwence Galeazzi, MD - Primary  Assistant: Saad Vázquez PA-C  Anesthesia: General   Estimated Blood Loss: 150cc  Fluids: 1000cc   Specimens: * No specimens in log *   Findings: same  Complications: none  Implants:   Implant Name Type Inv. Item Serial No.  Lot No. LRB No. Used Action   SCR PDCL RESET SLD 7.0X50MM -- GEN 3 - [id number]  SCR PDCL RESET SLD 7.0X50MM -- GEN 3  SPINEFRONTIER [id number] N/A 4 Implanted   GRAFT PUTTY DBM H-GENIN 10CC --  - [id number]  GRAFT PUTTY DBM H-GENIN 10CC --  [id number] SPINEFRONTIER [id number] N/A 1 Implanted   SCR PDCL RESET SLD 7.0X45MM -- GEN 3 - [id number]  SCR PDCL RESET SLD 7.0X45MM -- GEN 3  SPINEFRONTIER [id number] N/A 4 Implanted   THERON SP LORDTC PEDFUSE 5.5X100 --  - [id number]  THERON SP LORDTC PEDFUSE 5.5X100 --   SPINEFRONTIER [id number] N/A 2 Implanted   SCR SET PEDFUSE 5-7MM --  - [id number]  SCR SET PEDFUSE 5-7MM --   SPINEFRONTIER [id number] N/A 8 Implanted   SCR SPNE ADJ CRV 30-35MM -- PEDFUSE - [id number]  SCR SPNE ADJ CRV 30-35MM -- PEDFUSE  SPINEFRONTIER Sukumari N/A 1 Implanted         Indications: This is a 46y.o. year-old female who presents with significant back   and bilateral lower extremity pain, worsening ability to do activities of daily living. X-rays and MRI scan revealing severe spinal stenosis, degenerative disk disease and disk herniation. Having failed conservative care, he comes for operative intervention.       DESCRIPTION OF PROCEDURE: After correct identification, the patient was   taken to the operating room, placed supine on the table, general   endotracheal anesthesia induced. 2grams of Ancef was given. Patient was then rotated prone onto the Faby Mao table. Lumbar spine was prepped and draped in the usual sterile fashion. Midline incision was made in the lumbar spine, taken down to the spinous processes of L3-S1. The posterior transverse processes bilaterally were expose at L3-S1 as seen on intraoperative fluoroscopy. Gelpi retractors were then placed. The wound was then irrigated. Complete wide laminectomy was performed at L4, L5 as well as the inferior   half of L3 bilaterally and the superior half of S1 bilaterally. This included a revision of the laminectomy on the right L5. Removal of more than 1/2 of the medial facets bilaterally allowed excellent midline decompression. Foraminotomies which included undercutting the pars intra-articularis were then performed bilaterally at L3-S1 until a Penfield 3 was easily passed through each of the neural foramen. This allowed visualization of the L3, L4, L5 and S1 nerve roots. The wound   was then irrigated. Bur was then used to open the posterior aspect of the   pedicles bilaterally at L3-S1. A gearshift probe was then placed through   each of these posterior bur holes, through the pedicle, into vertebral body   under intraoperative fluoroscopy. Ball-tipped probe was then placed through   each gearshift tracts, ensuring the gearshift had only gone through the bone. Pedicle screws from SpineFrontier spinal system were then placed bilaterally at L3-S1. Rods were then fixed to the pedicle screws using the locking caps. Each of these caps were then torqued into position. Intraoperative x-ray revealed excellent alignment of the  hardware and anatomy. Wound was then irrigated with 1000cc of pulse lavage irrigation containing Bacitracin.  Bur was then used to open the posterior aspect of the facet joints and transverse process bilaterally as well as removing of the cartilage surface of the facet joints. Bone graft that was taken from the laminectomy site was then placed in the   posterolateral gutters as well as in facet joints. Drain was then placed. Fascia was then closed using #1 Vicryl suture. Subcutaneous tissue   approximated with 2-0 Vicryl suture. Skin approximated with staples. Sterile dressing was applied. The patient tolerated the procedure well and taken to Recovery room in good   condition.

## 2019-02-05 NOTE — PERIOP NOTES
Spoke with Dr. Mahogany Gonzalez regarding patient FSBS of 226. Patient reports she normally has blood sugars betweek 400-600. Dr. Mahogany Gonzalez does not want to give insulin for blood sugar and has requested we contact the  for instructions on how to shield pump during procedure.

## 2019-02-06 VITALS
RESPIRATION RATE: 12 BRPM | HEIGHT: 61 IN | TEMPERATURE: 98.2 F | BODY MASS INDEX: 34.08 KG/M2 | DIASTOLIC BLOOD PRESSURE: 46 MMHG | WEIGHT: 180.5 LBS | HEART RATE: 86 BPM | SYSTOLIC BLOOD PRESSURE: 138 MMHG | OXYGEN SATURATION: 92 %

## 2019-02-06 PROBLEM — M48.061 SPINAL STENOSIS, LUMBAR: Status: RESOLVED | Noted: 2019-02-05 | Resolved: 2019-02-06

## 2019-02-06 PROBLEM — M48.062 SPINAL STENOSIS OF LUMBAR REGION WITH NEUROGENIC CLAUDICATION: Status: RESOLVED | Noted: 2019-01-29 | Resolved: 2019-02-06

## 2019-02-06 LAB
ERYTHROCYTE [DISTWIDTH] IN BLOOD BY AUTOMATED COUNT: 12.9 % (ref 11.6–14.5)
EST. AVERAGE GLUCOSE BLD GHB EST-MCNC: 246 MG/DL
GLUCOSE BLD STRIP.AUTO-MCNC: 200 MG/DL (ref 70–110)
GLUCOSE BLD STRIP.AUTO-MCNC: 202 MG/DL (ref 70–110)
GLUCOSE BLD STRIP.AUTO-MCNC: 204 MG/DL (ref 70–110)
GLUCOSE BLD STRIP.AUTO-MCNC: 232 MG/DL (ref 70–110)
GLUCOSE BLD STRIP.AUTO-MCNC: 267 MG/DL (ref 70–110)
GLUCOSE BLD STRIP.AUTO-MCNC: 279 MG/DL (ref 70–110)
GLUCOSE BLD STRIP.AUTO-MCNC: 287 MG/DL (ref 70–110)
GLUCOSE BLD STRIP.AUTO-MCNC: 289 MG/DL (ref 70–110)
GLUCOSE BLD STRIP.AUTO-MCNC: 315 MG/DL (ref 70–110)
HBA1C MFR BLD: 10.2 % (ref 4.2–5.6)
HCT VFR BLD AUTO: 35.1 % (ref 35–45)
HGB BLD-MCNC: 11.6 G/DL (ref 12–16)
MCH RBC QN AUTO: 30 PG (ref 24–34)
MCHC RBC AUTO-ENTMCNC: 33 G/DL (ref 31–37)
MCV RBC AUTO: 90.7 FL (ref 74–97)
PLATELET # BLD AUTO: 192 K/UL (ref 135–420)
PMV BLD AUTO: 12.1 FL (ref 9.2–11.8)
RBC # BLD AUTO: 3.87 M/UL (ref 4.2–5.3)
WBC # BLD AUTO: 11.6 K/UL (ref 4.6–13.2)

## 2019-02-06 PROCEDURE — 97116 GAIT TRAINING THERAPY: CPT

## 2019-02-06 PROCEDURE — 97161 PT EVAL LOW COMPLEX 20 MIN: CPT

## 2019-02-06 PROCEDURE — 74011250637 HC RX REV CODE- 250/637: Performed by: PHYSICIAN ASSISTANT

## 2019-02-06 PROCEDURE — 74011250636 HC RX REV CODE- 250/636: Performed by: ORTHOPAEDIC SURGERY

## 2019-02-06 PROCEDURE — 83036 HEMOGLOBIN GLYCOSYLATED A1C: CPT

## 2019-02-06 PROCEDURE — 85027 COMPLETE CBC AUTOMATED: CPT

## 2019-02-06 PROCEDURE — 74011250637 HC RX REV CODE- 250/637: Performed by: ORTHOPAEDIC SURGERY

## 2019-02-06 PROCEDURE — 82962 GLUCOSE BLOOD TEST: CPT

## 2019-02-06 PROCEDURE — 97530 THERAPEUTIC ACTIVITIES: CPT

## 2019-02-06 PROCEDURE — 97535 SELF CARE MNGMENT TRAINING: CPT

## 2019-02-06 PROCEDURE — 74011250636 HC RX REV CODE- 250/636: Performed by: PHYSICIAN ASSISTANT

## 2019-02-06 PROCEDURE — 36415 COLL VENOUS BLD VENIPUNCTURE: CPT

## 2019-02-06 PROCEDURE — 97165 OT EVAL LOW COMPLEX 30 MIN: CPT

## 2019-02-06 PROCEDURE — 74011250636 HC RX REV CODE- 250/636: Performed by: INTERNAL MEDICINE

## 2019-02-06 RX ORDER — SODIUM CHLORIDE 0.9 % (FLUSH) 0.9 %
5-40 SYRINGE (ML) INJECTION EVERY 8 HOURS
Status: DISCONTINUED | OUTPATIENT
Start: 2019-02-06 | End: 2019-02-06 | Stop reason: HOSPADM

## 2019-02-06 RX ORDER — ONDANSETRON 4 MG/1
4 TABLET, ORALLY DISINTEGRATING ORAL
Qty: 30 TAB | Refills: 0 | Status: SHIPPED | OUTPATIENT
Start: 2019-02-06

## 2019-02-06 RX ORDER — LUBIPROSTONE 24 UG/1
24 CAPSULE, GELATIN COATED ORAL 2 TIMES DAILY WITH MEALS
Qty: 60 CAP | Refills: 0 | Status: SHIPPED | OUTPATIENT
Start: 2019-02-06 | End: 2019-03-08

## 2019-02-06 RX ORDER — DIAZEPAM 5 MG/1
5 TABLET ORAL
Qty: 60 TAB | Refills: 0 | Status: SHIPPED | OUTPATIENT
Start: 2019-02-06

## 2019-02-06 RX ORDER — FENTANYL CITRATE-0.9 % NACL/PF 900MCG/30
PATIENT CONTROLLED ANALGESIA VIAL INJECTION
Status: DISCONTINUED | OUTPATIENT
Start: 2019-02-06 | End: 2019-02-06

## 2019-02-06 RX ORDER — ALBUTEROL SULFATE 0.83 MG/ML
2.5 SOLUTION RESPIRATORY (INHALATION)
Status: CANCELLED | OUTPATIENT
Start: 2019-02-06

## 2019-02-06 RX ORDER — SODIUM CHLORIDE 0.9 % (FLUSH) 0.9 %
5-40 SYRINGE (ML) INJECTION AS NEEDED
Status: DISCONTINUED | OUTPATIENT
Start: 2019-02-06 | End: 2019-02-06 | Stop reason: HOSPADM

## 2019-02-06 RX ORDER — NALOXONE HYDROCHLORIDE 4 MG/.1ML
SPRAY NASAL
Qty: 1 EACH | Refills: 0 | Status: SHIPPED | OUTPATIENT
Start: 2019-02-06

## 2019-02-06 RX ORDER — INSULIN LISPRO 100 [IU]/ML
INJECTION, SOLUTION INTRAVENOUS; SUBCUTANEOUS
Status: DISCONTINUED | OUTPATIENT
Start: 2019-02-06 | End: 2019-02-06

## 2019-02-06 RX ADMIN — CEFAZOLIN SODIUM 2 G: 2 SOLUTION INTRAVENOUS at 08:42

## 2019-02-06 RX ADMIN — Medication 10 ML: at 14:13

## 2019-02-06 RX ADMIN — CEFAZOLIN SODIUM 2 G: 2 SOLUTION INTRAVENOUS at 01:40

## 2019-02-06 RX ADMIN — SODIUM CHLORIDE, SODIUM LACTATE, POTASSIUM CHLORIDE, AND CALCIUM CHLORIDE 125 ML/HR: 600; 310; 30; 20 INJECTION, SOLUTION INTRAVENOUS at 02:15

## 2019-02-06 RX ADMIN — OMEPRAZOLE 20 MG: 20 CAPSULE, DELAYED RELEASE ORAL at 08:40

## 2019-02-06 RX ADMIN — Medication 10 ML: at 02:15

## 2019-02-06 RX ADMIN — Medication 10 ML: at 02:14

## 2019-02-06 RX ADMIN — TAPENTADOL HYDROCHLORIDE 50 MG: 50 TABLET, FILM COATED ORAL at 10:16

## 2019-02-06 RX ADMIN — DOCUSATE SODIUM 100 MG: 100 CAPSULE, LIQUID FILLED ORAL at 08:39

## 2019-02-06 RX ADMIN — Medication: at 01:53

## 2019-02-06 RX ADMIN — ATORVASTATIN CALCIUM 20 MG: 20 TABLET, FILM COATED ORAL at 08:40

## 2019-02-06 RX ADMIN — ONDANSETRON 4 MG: 2 INJECTION INTRAMUSCULAR; INTRAVENOUS at 10:00

## 2019-02-06 RX ADMIN — TAPENTADOL HYDROCHLORIDE 50 MG: 50 TABLET, FILM COATED ORAL at 14:11

## 2019-02-06 RX ADMIN — ONDANSETRON 4 MG: 4 TABLET, ORALLY DISINTEGRATING ORAL at 14:11

## 2019-02-06 RX ADMIN — TRAMADOL HYDROCHLORIDE 100 MG: 50 TABLET, FILM COATED ORAL at 08:39

## 2019-02-06 RX ADMIN — SODIUM CHLORIDE, SODIUM LACTATE, POTASSIUM CHLORIDE, AND CALCIUM CHLORIDE 125 ML/HR: 600; 310; 30; 20 INJECTION, SOLUTION INTRAVENOUS at 11:26

## 2019-02-06 RX ADMIN — SERTRALINE HYDROCHLORIDE 50 MG: 50 TABLET ORAL at 08:40

## 2019-02-06 RX ADMIN — LISINOPRIL 10 MG: 5 TABLET ORAL at 08:39

## 2019-02-06 NOTE — PROGRESS NOTES
Hospitalist Progress Note Patient: Mookie Perez MRN: [id number]  CSN: [id number] YOB: 1965  Age: 46 y.o. Sex: female DOA: 2/5/2019 LOS:  LOS: 1 day Chief Complaint: 
 
Consult for DM on insulin pump s/p spinal surgery Assessment/Plan 1. IDDM with insulin pump 2. S/p lumbar surgery 3. Sleep apnea 4. Asthma 1. Patient resumed 100% of basal rate as of 1100 this AM. BGL reasonable after spinal surgery. Most recently 56. Patient is giving herself boluses at blood glucose draws. She has a follow up appointment with her PCP in two weeks, and sees her endocrinologist in March. Advised patient to continue her usual regimen upon returning home, monitor her BGL per her usual routine. 2. Further management colton primary team. Patient complaining of persistent pain and spasms. Patient gets nauseous with pain medications. Defer management to primary team for further care. 3. Continue CPAP qhs.  
4. No respiratory complaints at time of exam. 
 
Medicine signs off. Patient is medically appropriate for discharge. Please do not hesitate to return with any questions or concerns regarding medical management. Patient Active Problem List  
Diagnosis Code  DDD (degenerative disc disease), lumbar M51.36  
 HNP (herniated nucleus pulposus), lumbar M51.26  
 Status post lumbar surgery Z98.890  
 Diabetes type 1, uncontrolled ([facility] [address] E10.65  Sleep apnea G47.30  
 HTN (hypertension) I10  
 Asthma J45.909 Subjective: 
 
Back pain persistent. Intermittent leg spasms. Review of systems: 
 
Constitutional: denies fevers, chills, myalgias. +back/leg pain Respiratory: denies SOB, cough Cardiovascular: denies chest pain, palpitations Gastrointestinal: denies nausea, vomiting, diarrhea Vital signs/Intake and Output: 
Visit Vitals /46 Pulse 86 Temp 98.2 °F (36.8 °C) Resp 12 Ht 5' 1\" (1.549 m) Wt 81.9 kg (180 lb 8 oz) SpO2 92% BMI 34.11 kg/m² Current Shift:  No intake/output data recorded. Last three shifts:  02/04 1901 - 02/06 0700 In: 3250 [I.V.:3250] Out: 1910 [Urine:1600; Drains:160] Exam: 
 
General: Elderly appearing female, alert, NAD, OX3 Head/Neck: NCAT, supple, No masses, No lymphadenopathy CVS:Regular rate and rhythm, no M/R/G, S1/S2 heard, no thrill Lungs:Clear to auscultation bilaterally, no wheezes, rhonchi, or rales Abdomen: Soft, Nontender, No distention, Normal Bowel sounds, No hepatomegaly Extremities: No C/C/E, pulses palpable 2+ Skin:normal texture and turgor, no rashes, no lesions Neuro:grossly normal , follows commands Psych:appropriate Labs: Results:  
   
Chemistry No results for input(s): GLU, NA, K, CL, CO2, BUN, CREA, CA, AGAP, BUCR, TBIL, GPT, AP, TP, ALB, GLOB, AGRAT in the last 72 hours. CBC w/Diff Recent Labs 02/06/19 
0447 WBC 11.6 RBC 3.87* HGB 11.6* HCT 35.1  Cardiac Enzymes No results for input(s): CPK, CKND1, NIRMALA in the last 72 hours. No lab exists for component: Sundra Staff Coagulation No results for input(s): PTP, INR, APTT in the last 72 hours. No lab exists for component: INREXT Lipid Panel No results found for: CHOL, CHOLPOCT, CHOLX, CHLST, CHOLV, 626399, HDL, LDL, LDLC, DLDLP, 932024, VLDLC, VLDL, TGLX, TRIGL, TRIGP, TGLPOCT, CHHD, CHHDX  
BNP No results for input(s): BNPP in the last 72 hours. Liver Enzymes No results for input(s): TP, ALB, TBIL, AP, SGOT, GPT in the last 72 hours. No lab exists for component: DBIL Thyroid Studies No results found for: T4, T3U, TSH, TSHEXT Procedures/imaging: see electronic medical records for all procedures/Xrays and details which were not copied into this note but were reviewed prior to creation of Plan Perfecto Holly PA-C

## 2019-02-06 NOTE — PERIOP NOTES
Jas Mejia RN was given insulin pump stickers. Pt was transported to room 337. Pt skin assessment performed and nothing new was discovered at this time. Pt NAD at this time. Visit Vitals /54 Pulse 75 Temp 97.1 °F (36.2 °C) Resp 12 Ht 5' 1\" (1.549 m) Wt 81.9 kg (180 lb 8 oz) SpO2 96% BMI 34.11 kg/m²

## 2019-02-06 NOTE — PROGRESS NOTES
Problem: Self Care Deficits Care Plan (Adult) Goal: *Acute Goals and Plan of Care (Insert Text) Outcome: Resolved/Met Date Met: 02/06/19 Occupational Therapy EVALUATION/discharge Patient: Татьяна Carrillo (52 y.o. female) Date: 2/6/2019 Primary Diagnosis: Spinal stenosis, lumbar [M48.061] Spinal stenosis, lumbar [M48.061] Procedure(s) (LRB): 
L3-S1 REVISION  DECOMPRESSION AND FUSION W/C-ARM, \"SPEC POP\" (N/A) 1 Day Post-Op Precautions:  Fall, Back, Spinal 
 
ASSESSMENT AND RECOMMENDATIONS: 
Based on the objective data described below, the patient presents with ability to perform ADL tasks. Pt was supine with HOB elevated with mother present upon arrival. Pt required encouragement ot participate in therapy. Pt completed bed mobility via log roll with CGA, v/c, and extended time. Pt and mother were educated of LB dressing; strategies to help improve independence. Both verbalized understanding. Pt performed sit/stand transfer with RW and CGA. Pt performed short distance functional mobility with RW and CGA. Pt/mother were educated of home safety including managing with her dogs upon return back to her home. Pt plans to staying with her mother who is available to assist as needed upon discharge. Pt was left sitting in chair upon discharge to eat lunch tray and all needs met. Pt with no further OT/ADL concerns at this time. Skilled occupational therapy is not indicated at this time. Education: Reviewed Back Precautions, Brace management, body mechanics, home safety, adaptive strategies and adaptive dressing techniques including clothing modifications with patient and mother verbalizing understanding at this time. Discharge Recommendations: Home Health OT Further Equipment Recommendations for Discharge: TBD at next level of care SUBJECTIVE:  
Patient stated I'm in pain.  OBJECTIVE DATA SUMMARY:  
 
Past Medical History:  
Diagnosis Date  Asthma  Diabetes ([facility] [address] 1988  GERD (gastroesophageal reflux disease)  Hypertension 2012  Nausea & vomiting  Osteoarthritis  Peripheral neuropathy  Sleep apnea   
 instructed to bring CPAP day of surgery  Urinary incontinence  Vertigo Past Surgical History:  
Procedure Laterality Date  BREAST SURGERY PROCEDURE UNLISTED  1980's  
 bilateral surgery milk ducts  HX APPENDECTOMY [facility]  HX HYSTERECTOMY    
 vaginal  
 HX LUMBAR DISKECTOMY  HX MOHS PROCEDURES Right   
 arthroscopic  HX OOPHORECTOMY    
 bilateral  
 HX ORTHOPAEDIC Bilateral   
 carpal tunnel  HX PELVIC LAPAROSCOPY    
 x 4 endometriosis  HX UROLOGICAL    
 stimulator  NEUROLOGICAL PROCEDURE UNLISTED Barriers to Learning/Limitations: None Compensate with: visual, verbal, tactile, kinesthetic cues/model Prior Level of Function/Home Situation: Pt was Ind with ADLs/IADLs prior. Home Situation Home Environment: Private residence # Steps to Enter: 3 Rails to Enter: Yes Hand Rails : Bilateral 
One/Two Story Residence: One story Lift Chair Available: No 
Living Alone: No 
Support Systems: Spouse/Significant Other/Partner, Family member(s) Patient Expects to be Discharged to[de-identified] Private residence Current DME Used/Available at Home: Commode, bedside, Shower chair Tub or Shower Type: Shower Cognitive/Behavioral Status: 
Neurologic State: Alert; Appropriate for age Orientation Level: Appropriate for age;Oriented X4 Cognition: Appropriate decision making; Appropriate for age attention/concentration; Appropriate safety awareness; Follows commands Safety/Judgement: Awareness of environment Skin: back incision with dressing Coordination: 
Coordination: Within functional limits Fine Motor Skills-Upper: Left Intact; Right Intact Gross Motor Skills-Upper: Left Intact; Right Intact Balance: 
Sitting: Intact Standing: Intact; With support Strength: BUE Strength: Generally decreased, functional 
 Tone & Sensation: BUE Tone: Normal 
Sensation: Impaired Range of Motion: BUE 
AROM: Within functional limits PROM: Within functional limits Functional Mobility and Transfers for ADLs: 
Bed Mobility: 
 Supine to Sit: Supervision Sit to Supine: Supervision Transfers: 
Sit to Stand: Supervision Bed to Chair: Contact guard assistance; Adaptive equipment ADL Assessment: 
Feeding: Independent Upper Body Dressing: Setup;Supervision(donning LSO) Lower Body Dressing: Other (comment); Minimum assistance; Moderate assistance(d/t spinal prec) ADL Intervention: 
Feeding Feeding Assistance: Independent Container Management: Independent Cutting Food: Independent Utensil Management: Independent Food to Mouth: Independent Drink to Mouth: Independent Upper Body Dressing Assistance Dressing Assistance: Supervision/set-up Lower Body Dressing Assistance Underpants: Minimum assistance; Moderate assistance Socks: Maximum assistance Position Performed: Seated edge of bed Adaptive Equipment Used: Eric Ba Cognitive Retraining Safety/Judgement: Awareness of environment Pain: 
Pre-treatment: 7/10 Post-treatment: 7/10 Activity Tolerance:  
Pt overall tolerated session well with no signs of fatigue or distress. Please refer to the flowsheet for vital signs taken during this treatment. After treatment:  
[x]  Patient left in no apparent distress sitting up in chair 
[]  Patient left in no apparent distress in bed 
[x]  Call bell left within reach [x]  Nursing notified 
[x]  Caregiver present 
[]  Bed alarm activated COMMUNICATION/EDUCATION:  
Communication/Collaboration: 
[x]      Home safety education was provided and the patient/caregiver indicated understanding. [x]      Patient/family have participated as able and agree with findings and recommendations. []      Patient is unable to participate in plan of care at this time.  
 
Thank you for this referral. 
Juwan Allison OTR/KATERIN 
 Time Calculation: 34 mins Eval Complexity: History: MEDIUM Complexity : Expanded review of history including physical, cognitive and psychosocial  history ; Examination: LOW Complexity : 1-3 performance deficits relating to physical, cognitive , or psychosocial skils that result in activity limitations and / or participation restrictions ; Decision Making:LOW Complexity : No comorbidities that affect functional and no verbal or physical assistance needed to complete eval tasks

## 2019-02-06 NOTE — PROGRESS NOTES
0730 Assumed care of the patient from (offgoing Nurse). Patient is alert and oriented. Has omar drain hemovac with sangenous drainage. PCA pump Discontinued. bed in low position, call bell within reach.

## 2019-02-06 NOTE — ANESTHESIA POSTPROCEDURE EVALUATION
Post-Anesthesia Evaluation & Assessment Visit Vitals /57 Pulse 75 Temp 36.2 °C (97.1 °F) Resp 16 Ht 5' 1\" (1.549 m) Wt 81.9 kg (180 lb 8 oz) SpO2 97% BMI 34.11 kg/m² Nausea/Vomiting: Controlled. Post-operative hydration adequate. Pain Scale 1: Numeric (0 - 10) (02/05/19 1755) Pain Intensity 1: 0 (02/05/19 1755) Managed Pain score at or below stated goal level. Mental status & Level of consciousness: alert and oriented x 3 Neurological status: moves all extremities, sensation grossly intact Pulmonary status: airway patent, adequate oxygenation. Complications related to anesthesia: none Patient has met all PACU discharge requirements.  
 
 
Rolanda Pabon DO

## 2019-02-06 NOTE — ROUTINE PROCESS
TRANSFER - IN REPORT: 
 
Verbal report received from Davin Rodriguez RN(name) on [address]  being received from PACU(unit) for routine progression of care Report consisted of patients Situation, Background, Assessment and  
Recommendations(SBAR). Information from the following report(s) SBAR, Kardex, Intake/Output, MAR, Recent Results and Med Rec Status was reviewed with the receiving nurse. Opportunity for questions and clarification was provided. Assessment completed upon patients arrival to unit and care assumed.

## 2019-02-06 NOTE — ROUTINE PROCESS
Bedside and Verbal shift change report given to Fred Gr RN (oncoming nurse) by Katrin Singletary RN 
 (offgoing nurse). Report included the following information SBAR, Kardex, Procedure Summary, Intake/Output, MAR, Recent Results and Med Rec Status.

## 2019-02-06 NOTE — PROGRESS NOTES
Transition of Care (BRIDGETT) Plan:     Met with patient at bedside she lives with her . States she will be going back with her mothers home at [facility] [address] when she is d/c. States she is waiting for Pt to come clear her for her to go home. States medicine already cleared her to go home. Stats she does use a Insulin pump. States that Dr. Bhavin Chopra has prearrange her with [facility] and [facility] has delivered her RW and BSC. Patient denies other needs fron cm sh ehas pcp and has [employer] for insurance.  states he [facility]  [facility]. Patient denies other needs BRIDGETT Transportation: How is patient being transported at discharge? Family/Friend When? Once cleared by Therapy between 12-2pm 
 
 Is transport scheduled? N/A Follow-up appointment and transportation: PCP/Specialist?  See AVS for Appointment Who is transporting to the follow-up appointment? Family/Friend Is transport for follow up appointment scheduled? N/A Communication plan (with patient/family): Who is being called? Patient or Next of Kin? Responsible party? Patient What number(s) is to be used? See [employer] What service provider is calling for [facility] services? When are they calling? 24-48 hours following discharge Readmission Risk? (Green/Low; Yellow/Moderate; Red/High):  [employer] Care Management Interventions PCP Verified by CM: Yes Transition of Care Consult (CM Consult): Home Health [facility] - INPATIENT: No 
Reason Outside [place]: Physician referred to specific agency Physical Therapy Consult: Yes 
Confirm Follow Up Transport: Family Plan discussed with Pt/Family/Caregiver: Yes Freedom of Choice Offered: Yes Discharge Location Discharge Placement: Home with home health

## 2019-02-06 NOTE — PROGRESS NOTES
Problem: Falls - Risk of 
Goal: *Absence of Falls Document Sunni Primes Fall Risk and appropriate interventions in the flowsheet. Outcome: Progressing Towards Goal 
Fall Risk Interventions: 
  
 
  
 
Medication Interventions: Evaluate medications/consider consulting pharmacy Elimination Interventions: Call light in reach

## 2019-02-06 NOTE — PROGRESS NOTES
Problem: Falls - Risk of 
Goal: *Absence of Falls Document Craig Hernandes Fall Risk and appropriate interventions in the flowsheet. Outcome: Progressing Towards Goal 
Fall Risk Interventions: 
  
 
  
 
Medication Interventions: Evaluate medications/consider consulting pharmacy, Teach patient to arise slowly, Patient to call before getting OOB Elimination Interventions: Call light in reach, Patient to call for help with toileting needs, Toileting schedule/hourly rounds

## 2019-02-06 NOTE — DISCHARGE INSTRUCTIONS
Patient Education        Learning About Insulin Pumps  What is an insulin pump? An insulin pump is a tiny computer that delivers insulin into your body. With a pump, you don't have to give yourself insulin shots throughout the day. You program the pump to do this. You can also give yourself an extra dose of insulin when you need it. A pump may give you more freedom to eat, sleep, and exercise when you want. How does it work? The pump sends insulin through a narrow plastic tube (a catheter) that ends in a tiny needle. The needle goes into your skin. The tube and needle are called an infusion set. With most infusion sets, the needle pulls out, leaving a tiny flexible tube called a cannula under your skin. You can't even feel that it's there. Some pumps attach directly to the body and do not need tubing. With this type, a remote device controls the pump. And some pumps are disposable and do not use tubing or a remote control. A pump with no tubing is sometimes called a \"pump patch. \"  An insulin pump gives you a constant trickle of insulin throughout the day and night. This is called your basal amount. You set this up to keep your blood sugar in your target range throughout the day. You can use the pump to give yourself extra insulin when you eat a meal or a snack. You can give yourself less insulin when you are very active or exercising. You also can give yourself more insulin any time you feel you need more than your basal amount of insulin. Why do some people prefer pumps? An insulin pump can help you control your blood sugar. · A pump may help you keep your blood sugar closer to normal. You may have fewer big swings in your blood sugar levels. · A pump can deliver an exact amount of insulin and in very small amounts. · The pump may help you keep your blood sugar under control without also causing low blood sugar. · A pump may improve your hemoglobin A1c level.   · You don't have to give yourself shots several times a day. · You don't have to plan your life around your insulin shots. You don't have to stop what you're doing and give yourself a shot. · Some pumps also work as a blood sugar meter or they communicate with your meter. Some pumps continuously measure glucose. And some pumps can suggest how much insulin you need based on blood sugar readings. What are the drawbacks? Diabetes control  · A pump may not improve blood sugar control if you are already giving yourself insulin shots 3 or more times a day. · If you keep your blood sugar levels in a tight range, it may be harder for you to sense when your blood sugar is low. · If you are not good at counting your carbohydrate grams, the pump may not help you control your diabetes. Safety  · Diabetic ketoacidosis (DKA) may happen more often and more quickly with an insulin pump than with shots. Your blood sugar could get too high if something goes wrong with the catheter or pump and you don't notice. · You may get an infection where the catheter goes into the skin. You'll need to take good care of the site and change the catheter on schedule. Effort  · It may take time to set up the pump. You may have to spend time at a clinic, skip a few meals, and check your blood sugar more often than usual.  · Many insurance companies have strict guidelines that you will have to follow or they will not pay. · You may need to replace your infusion set every 2 or 3 days. · You may have to learn new routines in how and when you test your blood sugar and when you travel, play sports, and eat. How do you choose an insulin pump? Some people say choosing which pump to use is harder than deciding to switch to a pump. There are a number of insulin pump companies, and each pump is slightly different. Ask members of your diabetes team which pumps they recommend. If you have insurance, find out which pump brands are covered.  Then ask those companies to send you information or check their websites. You should be able to try out a pump with saline solution. That way you can see how it works and feels. Follow-up care is a key part of your treatment and safety. Be sure to make and go to all appointments, and call your doctor if you are having problems. It's also a good idea to know your test results and keep a list of the medicines you take. Where can you learn more? Go to http://jeb-claudette.info/. Enter F333 in the search box to learn more about \"Learning About Insulin Pumps. \"  Current as of: July 25, 2018  Content Version: 11.9  © 6724-6784 [employer]. Care instructions adapted under license by [employer] (which disclaims liability or warranty for this information). If you have questions about a medical condition or this instruction, always ask your healthcare professional. [place] [address] any warranty or liability for your use of this information. Patient Education        High Blood Pressure: Care Instructions  Overview    It's normal for blood pressure to go up and down throughout the day. But if it stays up, you have high blood pressure. Another name for high blood pressure is hypertension. Despite what a lot of people think, high blood pressure usually doesn't cause headaches or make you feel dizzy or lightheaded. It usually has no symptoms. But it does increase your risk of stroke, heart attack, and other problems. You and your doctor will talk about your risks of these problems based on your blood pressure. Your doctor will give you a goal for your blood pressure. Your goal will be based on your health and your age. Lifestyle changes, such as eating healthy and being active, are always important to help lower blood pressure. You might also take medicine to reach your blood pressure goal.  Follow-up care is a key part of your treatment and safety.  Be sure to make and go to all appointments, and call your doctor if you are having problems. It's also a good idea to know your test results and keep a list of the medicines you take. How can you care for yourself at home? Medical treatment  · If you stop taking your medicine, your blood pressure will go back up. You may take one or more types of medicine to lower your blood pressure. Be safe with medicines. Take your medicine exactly as prescribed. Call your doctor if you think you are having a problem with your medicine. · Talk to your doctor before you start taking aspirin every day. Aspirin can help certain people lower their risk of a heart attack or stroke. But taking aspirin isn't right for everyone, because it can cause serious bleeding. · See your doctor regularly. You may need to see the doctor more often at first or until your blood pressure comes down. · If you are taking blood pressure medicine, talk to your doctor before you take decongestants or anti-inflammatory medicine, such as ibuprofen. Some of these medicines can raise blood pressure. · Learn how to check your blood pressure at home. Lifestyle changes  · Stay at a healthy weight. This is especially important if you put on weight around the waist. Losing even 10 pounds can help you lower your blood pressure. · If your doctor recommends it, get more exercise. Walking is a good choice. Bit by bit, increase the amount you walk every day. Try for at least 30 minutes on most days of the week. You also may want to swim, bike, or do other activities. · Avoid or limit alcohol. Talk to your doctor about whether you can drink any alcohol. · Try to limit how much sodium you eat to less than 2,300 milligrams (mg) a day. Your doctor may ask you to try to eat less than 1,500 mg a day. · Eat plenty of fruits (such as bananas and oranges), vegetables, legumes, whole grains, and low-fat dairy products. · Lower the amount of saturated fat in your diet.  Saturated fat is found in animal products such as milk, cheese, and meat. Limiting these foods may help you lose weight and also lower your risk for heart disease. · Do not smoke. Smoking increases your risk for heart attack and stroke. If you need help quitting, talk to your doctor about stop-smoking programs and medicines. These can increase your chances of quitting for good. When should you call for help? Call 911 anytime you think you may need emergency care. This may mean having symptoms that suggest that your blood pressure is causing a serious heart or blood vessel problem. Your blood pressure may be over 180/120.   For example, call 911 if:    · You have symptoms of a heart attack. These may include:  ? Chest pain or pressure, or a strange feeling in the chest.  ? Sweating. ? Shortness of breath. ? Nausea or vomiting. ? Pain, pressure, or a strange feeling in the back, neck, jaw, or upper belly or in one or both shoulders or arms. ? Lightheadedness or sudden weakness. ? A fast or irregular heartbeat.     · You have symptoms of a stroke. These may include:  ? Sudden numbness, tingling, weakness, or loss of movement in your face, arm, or leg, especially on only one side of your body. ? Sudden vision changes. ? Sudden trouble speaking. ? Sudden confusion or trouble understanding simple statements. ? Sudden problems with walking or balance. ? A sudden, severe headache that is different from past headaches.     · You have severe back or belly pain.    Do not wait until your blood pressure comes down on its own.  Get help right away.   Call your doctor now or seek immediate care if:    · Your blood pressure is much higher than normal (such as 180/120 or higher), but you don't have symptoms.     · You think high blood pressure is causing symptoms, such as:  ? Severe headache.  ? Blurry vision.    Watch closely for changes in your health, and be sure to contact your doctor if:    · Your blood pressure measures higher than your doctor recommends at least 2 times. That means the top number is higher or the bottom number is higher, or both.     · You think you may be having side effects from your blood pressure medicine. Where can you learn more? Go to http://jeb-claudette.info/. Enter A370 in the search box to learn more about \"High Blood Pressure: Care Instructions. \"  Current as of: July 22, 2018  Content Version: 11.9  © 2940-7572 [employer]. Care instructions adapted under license by [employer] (which disclaims liability or warranty for this information). If you have questions about a medical condition or this instruction, always ask your healthcare professional. [address] any warranty or liability for your use of this information. Patient Education        Deciding About Surgery for Lumbar Spinal Stenosis  How can you decide about surgery for lumbar spinal stenosis? What is lumbar spinal stenosis? Lumbar spinal stenosis is the narrowing of the spinal canal in the lower back. This occurs when bone and other tissues grow inside the openings in the spinal bones. This can squeeze the nerves that branch out from the spinal cord. The squeezing can cause pain, numbness, or weakness. It happens most often in the legs, feet, or buttocks. You may choose to have surgery to ease your symptoms. Or you may try other treatments instead. These include medicines, exercise, and physical therapy. What are key points about this decision? · If your symptoms are mild to moderate, then medicine, physical therapy, and exercise may be all you need. · You may want surgery if you have tried other treatment for a while and your symptoms are still so bad that you can't do your normal activities. · Your symptoms may come back after a few years. You may need surgery again. · Surgery will most likely help leg pain. But it may not help back pain as much. Why might you choose to have surgery?   · Surgery can relieve pain. It can also improve how well you can walk. · You have tried other treatment for a while and your symptoms are still so bad that you can't do your normal activities. · Without surgery, your symptoms may still bother you. If symptoms are very painful, they most often will not improve on their own. · Your doctor may advise surgery if you can't control your bladder or bowels as well as you used to. Surgery is also an option if you notice sudden changes in how well you can walk or you are more clumsy than before. Why might you choose not to have surgery? · You may try other treatments to help your symptoms. These include medicine, exercise, and physical therapy. These other treatments work best for people with mild to moderate symptoms. · Risks from surgery include nerve injury and tissue tears. And you could have chronic pain, trouble passing urine, and a spine that is not stable. · All surgery has some risks. These include bleeding, infection, and risks from anesthesia. · You may not be able to return to all of your normal activities for many months. · Your symptoms may come back in a few years. You may need surgery again. Your decision  Thinking about the facts and your feelings can help you make a decision that is right for you. Be sure you understand the benefits and risks of your options, and think about what else you need to do before you make the decision. Where can you learn more? Go to http://jeb-claudette.info/. Enter P587 in the search box to learn more about \"Deciding About Surgery for Lumbar Spinal Stenosis. \"  Current as of: September 20, 2018  Content Version: 11.9  © 5185-6517 [employer]. Care instructions adapted under license by [employer] (which disclaims liability or warranty for this information).  If you have questions about a medical condition or this instruction, always ask your healthcare professional. [employer]ounger disclaims any warranty or liability for your use of this information. DISCHARGE SUMMARY from Nurse    PATIENT INSTRUCTIONS:    After general anesthesia or intravenous sedation, for 24 hours or while taking prescription Narcotics:  · Limit your activities  · Do not drive and operate hazardous machinery  · Do not make important personal or business decisions  · Do  not drink alcoholic beverages  · If you have not urinated within 8 hours after discharge, please contact your surgeon on call. Report the following to your surgeon:  · Excessive pain, swelling, redness or odor of or around the surgical area  · Temperature over 100.5  · Nausea and vomiting lasting longer than 4 hours or if unable to take medications  · Any signs of decreased circulation or nerve impairment to extremity: change in color, persistent  numbness, tingling, coldness or increase pain  · Any questions    What to do at Home:  Recommended activity: Activity as tolerated,       If you experience any of the following symptoms , please follow up with . *  Please give a list of your current medications to your Primary Care Provider. *  Please update this list whenever your medications are discontinued, doses are      changed, or new medications (including over-the-counter products) are added. *  Please carry medication information at all times in case of emergency situations. These are general instructions for a healthy lifestyle:    No smoking/ No tobacco products/ Avoid exposure to second hand smoke  Surgeon General's Warning:  Quitting smoking now greatly reduces serious risk to your health.     Obesity, smoking, and sedentary lifestyle greatly increases your risk for illness    A healthy diet, regular physical exercise & weight monitoring are important for maintaining a healthy lifestyle    You may be retaining fluid if you have a history of heart failure or if you experience any of the following symptoms:  Weight gain of 3 pounds or more overnight or 5 pounds in a week, increased swelling in our hands or feet or shortness of breath while lying flat in bed. Please call your doctor as soon as you notice any of these symptoms; do not wait until your next office visit. Recognize signs and symptoms of STROKE:    F-face looks uneven    A-arms unable to move or move unevenly    S-speech slurred or non-existent    T-time-call 911 as soon as signs and symptoms begin-DO NOT go       Back to bed or wait to see if you get better-TIME IS BRAIN. Warning Signs of HEART ATTACK     Call 911 if you have these symptoms:   Chest discomfort. Most heart attacks involve discomfort in the center of the chest that lasts more than a few minutes, or that goes away and comes back. It can feel like uncomfortable pressure, squeezing, fullness, or pain.  Discomfort in other areas of the upper body. Symptoms can include pain or discomfort in one or both arms, the back, neck, jaw, or stomach.  Shortness of breath with or without chest discomfort.  Other signs may include breaking out in a cold sweat, nausea, or lightheadedness. Don't wait more than five minutes to call 911 - MINUTES MATTER! Fast action can save your life. Calling 911 is almost always the fastest way to get lifesaving treatment. Emergency Medical Services staff can begin treatment when they arrive -- up to an hour sooner than if someone gets to the hospital by car. The discharge information has been reviewed with the patient. The patient verbalized understanding. Discharge medications reviewed with the patient and appropriate educational materials and side effects teaching were provided. ___________________________________________________________________________________________________________________________________[facility]  Dr. Dariel Toledo ANYONE WHO SMOKES. AVOID ALL PRODUCTS THAT CONTAIN NICOTINE.  DO NOT TAKE IBUPROFEN OR ANTI--INFLAMMATORIES, AS THESE MAY ALTER THE HEALING OF THE FUSION. ACTIVITIES :  *The first week after surgery   1. You may be up and walking about the house. 2.  Activities around the house, such as washing dishes, fixing light meals, and your own personal care are fine. 3.  Avoid strenuous activities, such as vacuuming, lifting laundry or grocery bags. 4.  Do not lift anything heavier than 1 gallon of milk (or about 5-8 pounds). 5.  Do not bend over to  items from the ground level until 3 months post-op. *Week 2 and beyond  1. You may gradually increase your activities, but still avoid heavy lifting, pushing/pulling. 2.  Walking is the best way to rebuild strength and stamina. Start SLOWLY and gradually increase the distance a little every week. 3.  Walk at a pace that avoids fatigue or severe pain. Do not try to walk several blocks the first day! As you increase the distance, you may feel tired. If so, stop and rest.   4.  You should be able to walk several blocks by your first clinic visit. 5.  Follow-up with Dr. Kiel Kidd in 10-14 days. BATHING and INCISION CARE:  1. The incision may be tender to touch or feel numb: this is normal.   2.  Keep the incision clean and dry. Do not get incision wet for 5 days. The incision will be closed with sutures under the skin and the skin will be glued. 3.  Do not apply any lotions, ointments or oils on the incision. 4.  If you notice any excessive swelling, redness, or persistent drainage around the incision, notify the office immediately. DRIVIN. You should not drive until after your follow-up appointment. 2.  You can be in a vehicle for short distances, but if you travel any long distance, please stop about every 30 minutes and walk/stretch. 3.  You should NEVER drive while taking narcotic medication. RETURN TO WORK :  1. The decision to return to work will be determined on an individual basis.    2. Many people who have a strenuous job (construction, heavy labor, etc) may need to be off work for up to 12 weeks. 3.  If you need a work note, please let us know as soon as possible, and not the same day you are planning to return to work. NUTRITION :  1.  Good nutrition is an essential part of healing. 2.  You should eat a balanced diet each day, including fruits, vegetables, dairy products and protein. 3.  Remember to drink plenty of water. 4.  If you have not had a bowel movement within 3 days of surgery, you will need to use a laxative or suppository that can be obtained over-the-counter at your local pharmacy. BONE STIMULATOR:  1. A spinal bone stimulator may be prescribed for you under certain situations. 2.  A nurse will call you if one has been requested and discuss its use for approximately 4-6 months post-op every day. 3.  This device assists in bone healing and fusion. MEDICATIONS -  1. You may resume the medications you were taking before surgery, with the general exception of (or DO NOT TAKE) non-steroidal anti-inflammatory medications, such as Motrin, Aleve, Advil Naprosyn, Ibuprofen or aspirin. 2.  You will receive a prescription for pain medication at discharge from the hospital. The pain medication works best if taken before the pain becomes severe. 3.  To reduce stomach upset, always take the medication with food. 4.  Begin to wean yourself off the pain medication during the second week after discharge. 5.  If you need a refill, please call the office during working hours at least 2 days before your prescription runs out. Do not wait until your bottle is empty to call for a refill. 6.  DO NOT drive if you are taking narcotic pain medications.     HOME HEALTH CARE:  1.   A home health care service has been set-up for you to help assist you once you leave the hospital.  2.  They will contact you either before you leave the hospital or within 24 hours once you have been discharged home. 3. A nurse will assist you with your dressing changes and a Physical Therapist with help you with your therapy needs. CALL THE OFFICE:   If you have severe pain unrelieved by the medications, new numbness or tingling in your legs;   If you have a fever of 101.0°F or greater;    If you notice excessive swelling, redness, or persistent drainage from the incision or IV site; [facility] office number is [phone] from 8:00am to 5:00pm Monday through Friday. After 5:00pm, on weekends, or holidays, please leave a message with our answering service and the doctor on-call will get back to you shortly.       Patient armband removed and shredded

## 2019-02-06 NOTE — PROGRESS NOTES
2020 Pt arrived to the unit. No PCA pump. Paged the doctor. Gave one time dose of IV push fentanyl. 2030 Spoke to Dr. Magdiel Gage to get recommendations for pain relief/ 
 
2300 Paged glycemic control. 2310 Pt refusing Cpap and requesting PCA pump. Paged doctor to get orders. Shift uneventful. Pt is stable with no signs of distress.

## 2019-02-06 NOTE — PROGRESS NOTES
Problem: Mobility Impaired (Adult and Pediatric) Goal: *Acute Goals and Plan of Care (Insert Text) Goals to be addressed in 1-4 days: STG 
1. Rolling L to R to L modified independent for positioning. 2. Supine to sit to supine S with HR for meals. 3. Sit to stand to sit S with RW in prep for ambulation. LTG 1. Ambulate >150ft S with RW, WBAT, for home/community mobility. 2. Ascend/descend a >3 stair steps CGA with HR for home entry. 3. Tolerate up in chair 1-2 hours for ADLs. 4. Patient/family to be independent with HEP for follow-up care and safe discharge. Outcome: Resolved/Met Date Met: 02/06/19 
physical Therapy TREATMENT/DISCHARGE Patient: Edith Tomlin (52 y.o. female) Date: 2/6/2019 Diagnosis: Spinal stenosis, lumbar [M48.061] Spinal stenosis, lumbar [M48.061] Spinal stenosis of lumbar region with neurogenic claudication Procedure(s) (LRB): 
L3-S1 REVISION  DECOMPRESSION AND FUSION W/C-ARM, \"SPEC POP\" (N/A) 1 Day Post-Op Precautions: Fall, Back, Spinal 
Chart, physical therapy assessment, plan of care and goals were reviewed. ASSESSMENT: 
Pt meets needs for safe home mobility, expresses understanding of HEP and is cleared from this level of PT. Progression toward goals: 
[x]      Goals met 
[]      Improving appropriately and progressing toward goals 
[]      Improving slowly and progressing toward goals 
[]      Not making progress toward goals and plan of care will be adjusted PLAN: 
Patient will be discharged from physical therapy at this time. Rationale for discharge: 
[x] Goals Achieved 
[] [address] 
[] Patient not participating in therapy 
[] Other: 
Discharge Recommendations:  [address] [facility] Further Equipment Recommendations for Discharge:  rolling walker SUBJECTIVE:  
Patient stated \" Okay, I am ready\" OBJECTIVE DATA SUMMARY:  
Critical Behavior: 
Neurologic State: Alert Functional Mobility Training: 
Bed Mobility: 
Supine to Sit: Supervision Sit to Supine: Supervision Transfers: 
Sit to Stand: Supervision Stand to Sit: Supervision Balance: 
Sitting: Intact Standing: Intact; With supportAmbulation/Gait Training: 
Distance (ft): 150 Feet (ft) Assistive Device: Walker, rolling;Gait belt Ambulation - Level of Assistance: Supervision Gait Description (WDL): Exceptions to [facility] Gait Abnormalities: Decreased step clearance Base of Support: Widened Speed/Beth: Slow Step Length: Right shortened;Left shortened Stairs: 
Number of Stairs Trained: 3(box step) Stairs - Level of Assistance: Contact guard assistance Rail Use: (RW) 
 
Pain: 
Pain Scale 1: Numeric (0 - 10) Pain Intensity 1: 6 Pain Location 1: Back Pain Orientation 1: Mid 
Pain Description 1: Throbbing Pain Intervention(s) 1: Medication (see MAR) Activity Tolerance:  
Fair After treatment:  
[] Patient left in no apparent distress sitting up in chair 
[x] Patient left in no apparent distress in bed 
[x] Call bell left within reach [x] Nursing notified 
[x] Caregiver present 
[] Bed alarm activated Khushi Cooper PTA Time Calculation: 29 mins

## 2019-02-06 NOTE — CONSULTS
Medicine Consult Note    Patient: Augustine Del Toro   Age:  46 y.o. Consulting Physician: Waldemar Wilson MD  Reason for Consult:  Diabetes on Insulin Pump   Nausea pain control     HPI:   Augustine Del Toro is a 46 y.o. female who is post lumbar fusion surgery asked to see for DM management post op  Use insulin pump and it was decreased to 80 percent of basal  She is tolerating diabetic diet and is on 1 blood sugar  Unable to get PCA due to shortage has multiple allergies to pain meds including anaphylactic shock and severe nausea   Has known gastroparesis   Blood sugar log reviewed from 209 to 130         Past Medical History:  Past Medical History:   Diagnosis Date    Asthma     Diabetes ([facility] [address]     GERD (gastroesophageal reflux disease)     Hypertension 2012    Nausea & vomiting     Osteoarthritis     Peripheral neuropathy     Sleep apnea     instructed to bring CPAP day of surgery    Urinary incontinence     Vertigo        Past Surgical History:  Past Surgical History:   Procedure Laterality Date    BREAST SURGERY PROCEDURE UNLISTED      bilateral surgery milk ducts    HX APPENDECTOMY      HX  SECTION      HX HYSTERECTOMY      vaginal    HX LUMBAR DISKECTOMY      HX MOHS PROCEDURES Right     arthroscopic    HX OOPHORECTOMY      bilateral    HX ORTHOPAEDIC Bilateral     carpal tunnel    HX PELVIC LAPAROSCOPY      x 4 endometriosis    HX UROLOGICAL      stimulator    NEUROLOGICAL PROCEDURE UNLISTED         Family History:  History reviewed. No pertinent family history.     Social History:  Social History     Socioeconomic History    Marital status:      Spouse name: Not on file    Number of children: Not on file    Years of education: Not on file    Highest education level: Not on file   Tobacco Use    Smoking status: Never Smoker    Smokeless tobacco: Never Used   Substance and Sexual Activity    Alcohol use: No    Drug use: No    Sexual activity: Yes Home Medications:  Prior to Admission medications    Medication Sig Start Date End Date Taking? Authorizing Provider   omeprazole (PRILOSEC) 20 mg capsule Take 20 mg by mouth daily. Yes Provider, Historical   atorvastatin (LIPITOR) 20 mg tablet Take 20 mg by mouth daily. Yes Provider, Historical   lisinopril (PRINIVIL, ZESTRIL) 10 mg tablet Take 10 mg by mouth daily. Yes Provider, Historical   sertraline (ZOLOFT) 50 mg tablet Take 50 mg by mouth daily. Yes Provider, Historical   insulin CONCENTRATED regular (U-500 CONCENTRATED) 500 unit/mL soln by SubCUTAneous route. 24 hours continuous   Yes Provider, Historical   gabapentin (NEURONTIN) 100 mg capsule Take 100 mg by mouth nightly. Provider, Historical   Alpha Lipoic Acid 600 mg cap Take 2 Tabs by mouth two (2) times a day. Provider, Historical   acetaminophen (TYLENOL) 325 mg tablet Take 975 mg by mouth daily as needed for Pain. Provider, Historical       Allergies: Allergies   Allergen Reactions    Morphine Hives, Shortness of Breath and Swelling    Morphine (Pf) Anaphylaxis    Ciprofloxacin Nausea and Vomiting    Macrobid [Nitrofurantoin Monohyd/M-Cryst] Nausea and Vomiting    Percocet [Oxycodone-Acetaminophen] Nausea Only    Sulfa (Sulfonamide Antibiotics) Hives and Nausea and Vomiting       Review of Systems  Pertinent items are noted in the History of Present Illness. Physical Exam:     Visit Vitals  /54   Pulse 75   Temp 97.1 °F (36.2 °C)   Resp 12   Ht 5' 1\" (1.549 m)   Wt 81.9 kg (180 lb 8 oz)   SpO2 96%   BMI 34.11 kg/m²       Physical Exam:  General appearance: alert, cooperative, no distress, appears stated age  Head: Normocephalic, without obvious abnormality, atraumatic  Neck: supple, trachea midline  Lungs: clear to auscultation bilaterally  Heart: regular rate and rhythm, S1, S2 normal, no murmur, click, rub or gallop  Abdomen: soft, non-tender.  Bowel sounds normal. No masses,  no organomegaly  Extremities: extremities normal, atraumatic, no cyanosis or edema  Skin: Skin color, texture, turgor normal. No rashes or lesions  Neurologic: Grossly normal back incision clean dry intact     Intake and Output:  Current Shift:  No intake/output data recorded. Last three shifts:  02/04 0701 - 02/05 1900  In: 3250 [I.V.:3250]  Out: 650 [Urine:500]    Lab/Data Reviewed: All lab results for the last 24 hours reviewed. Medications Reviewed.   Recent Results (from the past 24 hour(s))   GLUCOSE, POC    Collection Time: 02/05/19  7:36 AM   Result Value Ref Range    Glucose (POC) 226 (H) 70 - 110 mg/dL   TYPE & SCREEN    Collection Time: 02/05/19  7:55 AM   Result Value Ref Range    Crossmatch Expiration 02/08/2019     ABO/Rh(D) A POSITIVE     Antibody screen NEG    GLUCOSE, POC    Collection Time: 02/05/19 10:36 AM   Result Value Ref Range    Glucose (POC) 131 (H) 70 - 110 mg/dL   GLUCOSE, POC    Collection Time: 02/05/19 11:26 AM   Result Value Ref Range    Glucose (POC) 114 (H) 70 - 110 mg/dL   GLUCOSE, POC    Collection Time: 02/05/19 12:03 PM   Result Value Ref Range    Glucose (POC) 116 (H) 70 - 110 mg/dL   GLUCOSE, POC    Collection Time: 02/05/19 12:31 PM   Result Value Ref Range    Glucose (POC) 123 (H) 70 - 110 mg/dL   GLUCOSE, POC    Collection Time: 02/05/19  1:06 PM   Result Value Ref Range    Glucose (POC) 121 (H) 70 - 110 mg/dL   GLUCOSE, POC    Collection Time: 02/05/19  1:49 PM   Result Value Ref Range    Glucose (POC) 102 70 - 110 mg/dL   GLUCOSE, POC    Collection Time: 02/05/19  2:35 PM   Result Value Ref Range    Glucose (POC) 132 (H) 70 - 110 mg/dL   GLUCOSE, POC    Collection Time: 02/05/19  3:00 PM   Result Value Ref Range    Glucose (POC) 140 (H) 70 - 110 mg/dL   GLUCOSE, POC    Collection Time: 02/05/19  3:34 PM   Result Value Ref Range    Glucose (POC) 142 (H) 70 - 110 mg/dL   GLUCOSE, POC    Collection Time: 02/05/19  4:17 PM   Result Value Ref Range    Glucose (POC) 149 (H) 70 - 110 mg/dL   GLUCOSE, POC Collection Time: 02/05/19  5:04 PM   Result Value Ref Range    Glucose (POC) 173 (H) 70 - 110 mg/dL   GLUCOSE, POC    Collection Time: 02/05/19  5:59 PM   Result Value Ref Range    Glucose (POC) 189 (H) 70 - 110 mg/dL   GLUCOSE, POC    Collection Time: 02/05/19  7:05 PM   Result Value Ref Range    Glucose (POC) 243 (H) 70 - 110 mg/dL   GLUCOSE, POC    Collection Time: 02/05/19 10:01 PM   Result Value Ref Range    Glucose (POC) 274 (H) 70 - 110 mg/dL   GLUCOSE, POC    Collection Time: 02/05/19 11:01 PM   Result Value Ref Range    Glucose (POC) 290 (H) 70 - 110 mg/dL     Assessment/Plan   Principal Problem:    Spinal stenosis of lumbar region with neurogenic claudication (1/29/2019)    Active Problems:    DDD (degenerative disc disease), lumbar (1/29/2019)      HNP (herniated nucleus pulposus), lumbar (1/29/2019)      Status post lumbar surgery (1/31/2019)      Spinal stenosis, lumbar (2/5/2019)     DM     Sleep Apnea      Asthma    Plan:  Increase insulin basal rate to full dose in am   Advance diet as tolerated  Fentanyl for pain control with IV Zofran/phenergan prn   Agree with [facility] for pain if available  DVT prevention per primary team   CPAP for sleep apnea  Encourage incentive spirometry  Albuterol prn       Thank you will follow with you

## 2019-02-06 NOTE — PROGRESS NOTES
Problem: Mobility Impaired (Adult and Pediatric) Goal: *Acute Goals and Plan of Care (Insert Text) Goals to be addressed in 1-4 days: STG 
1. Rolling L to R to L modified independent for positioning. 2. Supine to sit to supine S with HR for meals. 3. Sit to stand to sit S with RW in prep for ambulation. LTG 1. Ambulate >150ft S with RW, WBAT, for home/community mobility. 2. Ascend/descend a >3 stair steps CGA with HR for home entry. 3. Tolerate up in chair 1-2 hours for ADLs. 4. Patient/family to be independent with HEP for follow-up care and safe discharge. Outcome: Progressing Towards Goal 
physical Therapy EVALUATION Patient: Marline Bauman (52 y.o. female) Date: 2/6/2019 Primary Diagnosis: Spinal stenosis, lumbar [M48.061] Spinal stenosis, lumbar [M48.061] Procedure(s) (LRB): 
L3-S1 REVISION  DECOMPRESSION AND FUSION W/C-ARM, \"SPEC POP\" (N/A) 1 Day Post-Op Precautions:   Fall, Back, Spinal 
 
ASSESSMENT : 
Based on the objective data described below, the patient presents with lower extremity weakness, decreased gait quality and endurance, impaired bed mobility and transfers, and overall limitations in functional mobility s/p L3-S1 revision D&F. Pt performed supine to sit with CGA, sit to stand with CGA. Patient ambulated 80 feet with RW, GB applied, SB-CGA for safety. Pt tolerated session well as evidenced by increased pain with mobility, nausea, no lightheadedness or dizziness. Patient would benefit from skilled inpatient physical therapy to address deficits, progress as tolerated to achieve long term goals and allow safe discharge. Patient will benefit from skilled intervention to address the above impairments. Patients rehabilitation potential is considered to be Good Factors which may influence rehabilitation potential include:  
[]         None noted 
[]         Mental ability/status [x]         Medical condition 
[]         Home/family situation and support systems []         Safety awareness [x]         Pain tolerance/management 
[]         Other: PLAN : 
Recommendations and Planned Interventions: 
[x]           Bed Mobility Training             [x]    Neuromuscular Re-Education 
[x]           Transfer Training                   []    Orthotic/Prosthetic Training 
[x]           Gait Training                          []    Modalities [x]           Therapeutic Exercises          []    Edema Management/Control 
[x]           Therapeutic Activities            [x]    Patient and Family Training/Education 
[]           Other (comment): Frequency/Duration: Patient will be followed by physical therapy twice daily to address goals. Discharge Recommendations: Home Health Further Equipment Recommendations for Discharge: N/A  
 
SUBJECTIVE:  
Patient stated I am doing ok, just having a lot of pain.  OBJECTIVE DATA SUMMARY:  
 
Past Medical History:  
Diagnosis Date  Asthma  Diabetes ([facility] [address] 1988  GERD (gastroesophageal reflux disease)  Hypertension 2012  Nausea & vomiting  Osteoarthritis  Peripheral neuropathy  Sleep apnea   
 instructed to bring CPAP day of surgery  Urinary incontinence  Vertigo Past Surgical History:  
Procedure Laterality Date  BREAST SURGERY PROCEDURE UNLISTED  1980's  
 bilateral surgery milk ducts  HX APPENDECTOMY [address]  HX HYSTERECTOMY    
 vaginal  
 HX LUMBAR DISKECTOMY  HX MOHS PROCEDURES Right   
 arthroscopic  HX OOPHORECTOMY    
 bilateral  
 HX ORTHOPAEDIC Bilateral   
 carpal tunnel  HX PELVIC LAPAROSCOPY    
 x 4 endometriosis  HX UROLOGICAL    
 stimulator  NEUROLOGICAL PROCEDURE UNLISTED Barriers to Learning/Limitations: None Compensate with: Visual Cues, Verbal Cues and Tactile Cues Prior Level of Function/Home Situation: Independent amb s/AD Home Situation Home Environment: Private residence # Steps to Enter: 3 Rails to Enter:  Yes 
 Hand Rails : Bilateral 
One/Two Story Residence: One story Lift Chair Available: No 
Living Alone: No 
Support Systems: Spouse/Significant Other/Partner, Family member(s) Patient Expects to be Discharged to[de-identified] Private residenceCritical Behavior: 
Neurologic State: Alert Psychosocial 
Family  Behaviors: Anxious; Hyper-vigilant Visitor Behaviors: Cooperative Purposeful Interaction: Yes Pt Identified Daily Priority: Clinical issues (comment) Caritas Process: Nurture loving kindness;Establish trust;Nurture spiritual self;Teaching/learning;Create healing environment; Attend basic human needs Caring Interventions: Reassure; Therapeutic modalities Reassure: Therapeutic listening; Informing; Acceptance;Caring rounds;Quiet presence Therapeutic Modalities: Humor; Intentional therapeutic touchSkin Condition/Temp: Dry;Warm Family  Behaviors: Anxious; Hyper-vigilant Skin Integrity: Incision (comment)(back) Skin Integumentary Skin Color: Appropriate for ethnicity Skin Condition/Temp: Dry;Warm 
Skin Integrity: Incision (comment)(back) Turgor: Non-tenting Varicosities: Absent Strength:   
Strength: Generally decreased, functional 
Tone & Sensation:  
Tone: Normal 
Sensation: Impaired Range Of Motion: 
AROM: Generally decreased, functional 
PROM: Generally decreased, functional 
Functional Mobility: 
Bed Mobility: 
Supine to Sit: Contact guard assistance Sit to Supine: Contact guard assistance Transfers: 
Sit to Stand: Contact guard assistance Stand to Sit: Contact guard assistance Balance:  
Sitting: Intact Standing: Intact; With supportAmbulation/Gait Training: 
Distance (ft): 80 Feet (ft) Assistive Device: Gait belt;Walker, rolling Ambulation - Level of Assistance: Stand-by assistance;Contact guard assistance Gait Description (WDL): Exceptions to [facility] Gait Abnormalities: Decreased step clearance; Antalgic Base of Support: Widened Speed/Beth: Slow Step Length: Right shortened;Left shortened Pain: Pain Scale 1: Numeric (0 - 10) Pain Intensity 1: 6 Pain Location 1: Back Pain Orientation 1: Mid 
Pain Description 1: Throbbing Pain Intervention(s) 1: Medication (see MAR) Activity Tolerance:  
Good Please refer to the flowsheet for vital signs taken during this treatment. After treatment:  
[]         Patient left in no apparent distress sitting up in chair 
[x]         Patient left in no apparent distress in bed 
[x]         Call bell left within reach [x]         Nursing notified 
[]         Caregiver present 
[]         Bed alarm activated COMMUNICATION/EDUCATION:  
[x]         Fall prevention education was provided and the patient/caregiver indicated understanding. [x]         Patient/family have participated as able in goal setting and plan of care. [x]         Patient/family agree to work toward stated goals and plan of care. []         Patient understands intent and goals of therapy, but is neutral about his/her participation. []         Patient is unable to participate in goal setting and plan of care. Thank you for this referral. 
Jakob Bill Time Calculation: 31 mins Eval Complexity: History: MEDIUM  Complexity : 1-2 comorbidities / personal factors will impact the outcome/ POC Exam:LOW Complexity : 1-2 Standardized tests and measures addressing body structure, function, activity limitation and / or participation in recreation  Presentation: LOW Complexity : Stable, uncomplicated  Clinical Decision Making:Low Complexity amb >30 ft c/ Overall Complexity:LOW

## 2019-02-06 NOTE — PROGRESS NOTES
Progress Note POD #1 Patient: Marlys Godwin               Sex: female          DOA: 2/5/2019 YOB: 1965      Surgery: Procedure(s): 
L3-S1 REVISION  DECOMPRESSION AND FUSION W/C-ARM, \"SPEC POP\" LOS: 1 day Subjective: No new complaints Objective:  
  
Visit Vitals /63 Pulse 85 Temp 98.4 °F (36.9 °C) Resp 12 Ht 5' 1\" (1.549 m) Wt 81.9 kg (180 lb 8 oz) SpO2 98% BMI 34.11 kg/m² Physical Exam: 
Neurological: motor strength: 5/5 in lower extremities bilaterally 
                        sensation: intact to light touch Patient mobility Intake and Output: 
Current Shift:  No intake/output data recorded. Last three shifts:  02/04 1901 - 02/06 0700 In: 3250 [I.V.:3250] Out: 1910 [Urine:1600; Drains:160] Lab/Data Reviewed: 
 
Lab/Data Reviewed: 
Lab Results Component Value Date/Time WBC 11.6 02/06/2019 04:47 AM  
 HGB 11.6 (L) 02/06/2019 04:47 AM  
 HCT 35.1 02/06/2019 04:47 AM  
 PLATELET 199 39/49/9871 04:47 AM  
 MCV 90.7 02/06/2019 04:47 AM  
 
No results found for: APTT No results found for: INR, PTMR, PTP, PT1, PT2 Assessment/Plan Principal Problem: 
  Spinal stenosis of lumbar region with neurogenic claudication (1/29/2019) Active Problems: 
  DDD (degenerative disc disease), lumbar (1/29/2019) HNP (herniated nucleus pulposus), lumbar (1/29/2019) Status post lumbar surgery (1/31/2019) Spinal stenosis, lumbar (2/5/2019) Diabetes type 1, uncontrolled ([address] (2/5/2019) Sleep apnea (2/5/2019) HTN (hypertension) (2/5/2019) Asthma (2/5/2019) 1. Stable 2. OOB with PT 
3. D/C Planning 4. D/C PCA 5. D/C kauffman 6. D/C drain & change dressing prior to discharge home. 7.Discharge to home after being cleared by P.T and Medicine. 8. Follow-up in 10 days with Dr. Cora Ibarra

## 2019-02-06 NOTE — PERIOP NOTES
TRANSFER - OUT REPORT: 
 
Verbal report given to Meena Hinton RN(name) on [employer]  being transferred to [address] [place] [address](unit) for routine post - op Report consisted of patients Situation, Background, Assessment and  
Recommendations(SBAR). Information from the following report(s) Intake/Output and MAR was reviewed with the receiving nurse. Lines:  
Peripheral IV 02/05/19 Right Hand (Active) Site Assessment Clean, dry, & intact 2/5/2019  3:55 PM  
Phlebitis Assessment 0 2/5/2019  3:55 PM  
Infiltration Assessment 0 2/5/2019  3:55 PM  
Dressing Status Clean, dry, & intact 2/5/2019  3:55 PM  
Dressing Type Transparent 2/5/2019  3:55 PM  
Hub Color/Line Status Green; Infusing;Patent 2/5/2019  8:43 AM  
  
 
Opportunity for questions and clarification was provided. Patient transported with: 
 O2 @ 2 liters Registered Nurse Tech

## 2022-03-18 PROBLEM — Z98.890 STATUS POST LUMBAR SURGERY: Status: ACTIVE | Noted: 2019-01-31

## 2022-03-18 PROBLEM — G47.30 SLEEP APNEA: Status: ACTIVE | Noted: 2019-02-05

## 2022-03-19 PROBLEM — M51.369 DDD (DEGENERATIVE DISC DISEASE), LUMBAR: Status: ACTIVE | Noted: 2019-01-29

## 2022-03-19 PROBLEM — M51.26 HNP (HERNIATED NUCLEUS PULPOSUS), LUMBAR: Status: ACTIVE | Noted: 2019-01-29

## 2022-03-19 PROBLEM — J45.909 ASTHMA: Status: ACTIVE | Noted: 2019-02-05

## 2022-03-20 PROBLEM — I10 HTN (HYPERTENSION): Status: ACTIVE | Noted: 2019-02-05

## 2022-12-23 ENCOUNTER — TRANSCRIBE ORDER (OUTPATIENT)
Dept: INTERVENTIONAL RADIOLOGY/VASCULAR | Age: 57
End: 2022-12-23

## 2022-12-23 DIAGNOSIS — M54.2 CERVICALGIA: Primary | ICD-10-CM

## 2022-12-23 DIAGNOSIS — M54.40 LUMBAGO WITH SCIATICA: ICD-10-CM

## 2022-12-23 NOTE — PROGRESS NOTES
Pre-call for upcoming cervical and lumbar myelogram appointment completed by this RN. Appointment scheduled based on patient preference and department availability. Patient instructed to arrive @ 0830 for 0930 appointment, and made aware they must have a  to and from the hospital for this procedure. Anticoagulation medications:  patient denies  Antidepressant medications:  cymbalta, no hold per guidelines    Spinal cord stimulator:  yes, instructed to bring remote day of procedure   Patient also has an implanted insuline pump + dexacom sensor, patient will remove prior to radiation exposure for appointment       Patient was educated on procedure, length of time for post procedure recovery in the care unit, and discharge instructions. Time for questions provided and verbal understanding of all procedure instructions was verified. Patient encouraged to call office with any further questions, call back #[phone] provided.

## 2022-12-29 ENCOUNTER — HOSPITAL ENCOUNTER (OUTPATIENT)
Dept: GENERAL RADIOLOGY | Age: 57
Discharge: HOME | End: 2022-12-29
Attending: PAIN MEDICINE | Admitting: RADIOLOGY
Payer: COMMERCIAL

## 2022-12-29 ENCOUNTER — APPOINTMENT (OUTPATIENT)
Dept: CT IMAGING | Age: 57
End: 2022-12-29
Attending: PAIN MEDICINE
Payer: COMMERCIAL

## 2022-12-29 VITALS
RESPIRATION RATE: 20 BRPM | DIASTOLIC BLOOD PRESSURE: 65 MMHG | SYSTOLIC BLOOD PRESSURE: 133 MMHG | OXYGEN SATURATION: 98 % | WEIGHT: 186.6 LBS | TEMPERATURE: 98 F | BODY MASS INDEX: 36.63 KG/M2 | HEART RATE: 70 BPM | HEIGHT: 60 IN

## 2022-12-29 DIAGNOSIS — M54.2 CERVICALGIA: ICD-10-CM

## 2022-12-29 DIAGNOSIS — M54.40 LUMBAGO WITH SCIATICA: ICD-10-CM

## 2022-12-29 PROCEDURE — 72126 CT NECK SPINE W/DYE: CPT

## 2022-12-29 PROCEDURE — 74011000636 HC RX REV CODE- 636: Performed by: PAIN MEDICINE

## 2022-12-29 PROCEDURE — 62305 MYELOGRAPHY LUMBAR INJECTION: CPT

## 2022-12-29 PROCEDURE — 72132 CT LUMBAR SPINE W/DYE: CPT

## 2022-12-29 RX ORDER — CARVEDILOL 25 MG/1
25 TABLET ORAL 2 TIMES DAILY WITH MEALS
COMMUNITY

## 2022-12-29 RX ORDER — DULOXETIN HYDROCHLORIDE 20 MG/1
20 CAPSULE, DELAYED RELEASE ORAL 2 TIMES DAILY
COMMUNITY

## 2022-12-29 RX ORDER — ACETAMINOPHEN 325 MG/1
650 TABLET ORAL
Status: DISCONTINUED | OUTPATIENT
Start: 2022-12-29 | End: 2022-12-29 | Stop reason: HOSPADM

## 2022-12-29 RX ORDER — LIDOCAINE HYDROCHLORIDE 10 MG/ML
1-5 INJECTION, SOLUTION EPIDURAL; INFILTRATION; INTRACAUDAL; PERINEURAL
Status: COMPLETED | OUTPATIENT
Start: 2022-12-29 | End: 2022-12-29

## 2022-12-29 RX ORDER — DIAZEPAM 5 MG/1
10 TABLET ORAL
Status: DISCONTINUED | OUTPATIENT
Start: 2022-12-29 | End: 2022-12-29

## 2022-12-29 RX ADMIN — IOPAMIDOL 15 ML: 612 INJECTION, SOLUTION INTRATHECAL at 10:21

## 2022-12-29 RX ADMIN — LIDOCAINE HYDROCHLORIDE 5 ML: 10 INJECTION, SOLUTION EPIDURAL; INFILTRATION; INTRACAUDAL; PERINEURAL at 10:22

## 2022-12-29 NOTE — DISCHARGE INSTRUCTIONS
Myelogram: About This Test  What is it? A myelogram uses X-rays and a special dye to make pictures of bones and nerves of the spine (spinal canal). The spinal canal holds the spinal cord, the spinal nerve roots, and the fluid-filled space between the bones in your spine. Why is this test done? A myelogram is done to check for: The cause of arm or leg numbness, weakness, or pain. Narrowing of the spinal canal (spinal stenosis). A tumor or infection causing problems with the spinal cord or nerve roots. A spinal disc that has ruptured (herniated disc). Inflammation of the membrane that covers the brain and spinal cord. Problems with the blood vessels to the spine. This test may help find the cause of pain that can't be found by other tests, such as an MRI or a CT scan. How do you prepare for the test?  Your doctor will tell you if you need to change how much you eat and drink before the myelogram. You may be asked to increase the amount of water you drink before the test. Follow the instructions your doctor gives you about eating and drinking. If you take a medicine that prevents blood clots, your doctor may tell you to stop taking it before your test. Or your doctor may tell you to keep taking it. (These medicines include aspirin and other blood thinners.) Make sure that you understand exactly what your doctor wants you to do. Be sure you have someone to take you home. Anesthesia and pain medicine will make it unsafe for you to drive or get home on your own. How is the test done? The dye is put into your spinal canal with a thin needle. This is called a lumbar puncture. The dye moves through the space so the nerve roots and spinal cord can be seen more clearly. After the dye is put in, you will lie still while the X-ray pictures are taken. How does it feel? You will feel a quick sting from a small needle that has medicine to numb the skin on your back.  You will also feel some pressure as the long, thin spinal needle is put into your spinal canal. You may feel a quick, sharp pain down your buttock or leg when the needle is moved in your spine. You may find it hard to lie on your stomach or side during this test.  The dye may make you feel warm and flushed and have a metallic taste in your mouth. Some people feel sick to their stomach or have a headache. Tell your doctor how you are feeling. How long does the test take? A myelogram usually takes 30 minutes to 1 hour. What happens after the test?  You will probably be able to go home 30 minutes to 2 hours after the test.  You may need to lie in bed with your head raised for 4 to 24 hours after the test. To prevent seizures, which are a rare side effect, do not bend over or lie down with your head lower than your body. Keeping your head higher than your body after a myelogram also may help prevent or reduce other side effects, such as headache, nausea, or vomiting. Avoid strenuous activity, such as running or heavy lifting, for at least 1 day after your myelogram.  Drink plenty of water after the myelogram. Your doctor will give you instructions on taking your regular medicines. When should you call for help? Call 911 anytime you think you may need emergency care. For example, call if:  You have a seizure. Call your doctor now or seek immediate medical care if:  You have any increase in pain, weakness, or numbness in your legs. You have a severe headache or stiff neck, or your eyes become very sensitive to light. You have a headache that lasts longer than 24 hours. You have problems urinating or having a bowel movement. You have a fever. Follow-up care is a key part of your treatment and safety. Be sure to make and go to all appointments, and call your doctor if you are having problems. It's also a good idea to keep a list of the medicines you take. Ask your doctor when you can expect to have your test results. Where can you learn more?   Go to http://www.gray.com/  Enter [id number] in the search box to learn more about \"Myelogram: About This Test.\"  Current as of: April 5, 2022               Content Version: 13.4  © 2006-2022 [employer]. Care instructions adapted under license by [employer] (which disclaims liability or warranty for this information). If you have questions about a medical condition or this instruction, always ask your healthcare professional. [address] any warranty or liability for your use of this information. DISCHARGE SUMMARY from Nurse    PATIENT INSTRUCTIONS:    After general anesthesia or intravenous sedation, for 24 hours or while taking prescription Narcotics:  Limit your activities  Do not drive and operate hazardous machinery  Do not make important personal or business decisions  Do  not drink alcoholic beverages  If you have not urinated within 8 hours after discharge, please contact your surgeon on call. Report the following to your surgeon:  Excessive pain, swelling, redness or odor of or around the surgical area  Temperature over 100.5  Nausea and vomiting lasting longer than 4 hours or if unable to take medications  Any signs of decreased circulation or nerve impairment to extremity: change in color, persistent  numbness, tingling, coldness or increase pain  Any questions    What to do at Home:  Recommended activity: No lifting, Driving, or Strenuous exercise for 48 hours. *  Please give a list of your current medications to your Primary Care Provider. *  Please update this list whenever your medications are discontinued, doses are      changed, or new medications (including over-the-counter products) are added. *  Please carry medication information at all times in case of emergency situations.     These are general instructions for a healthy lifestyle:    No smoking/ No tobacco products/ Avoid exposure to second hand smoke  Surgeon General's Warning:  Quitting smoking now greatly reduces serious risk to your health. Obesity, smoking, and sedentary lifestyle greatly increases your risk for illness    A healthy diet, regular physical exercise & weight monitoring are important for maintaining a healthy lifestyle    You may be retaining fluid if you have a history of heart failure or if you experience any of the following symptoms:  Weight gain of 3 pounds or more overnight or 5 pounds in a week, increased swelling in our hands or feet or shortness of breath while lying flat in bed. Please call your doctor as soon as you notice any of these symptoms; do not wait until your next office visit. The discharge information has been reviewed with the patient and spouse. The patient and spouse verbalized understanding. Discharge medications reviewed with the patient and spouse and appropriate educational materials and side effects teaching were provided.       Patient armband removed and shredded    ___________________________________________________________________________________________________________________________________

## 2022-12-29 NOTE — PROGRESS NOTES
Pt is all prepped and ready for procedure. 0930 Pt picked up for procedure   1030 Pt returned to care unit  1000 Pt picked up for CT scan  1040 Pt returned to care unit. Procedures tolerated well. Band-aid to mid lower back dry and intact. 1330 Discharge instructions reviewed with patient and spouse and they verbalized all understandings. 1400 Pt escorted via W/C to car and left with spouse in stable condition.

## 2025-03-24 ENCOUNTER — HOSPITAL ENCOUNTER (OUTPATIENT)
Facility: HOSPITAL | Age: 60
Discharge: HOME | End: 2025-03-27
Payer: COMMERCIAL

## 2025-03-24 DIAGNOSIS — Z01.818 PRE-OP TESTING: ICD-10-CM

## 2025-03-24 PROCEDURE — 83036 HEMOGLOBIN GLYCOSYLATED A1C: CPT

## 2025-03-25 LAB
EST. AVERAGE GLUCOSE BLD GHB EST-MCNC: 206 MG/DL
HBA1C MFR BLD: 8.8 % (ref 4.2–5.6)

## 2025-03-26 ENCOUNTER — ANESTHESIA EVENT (OUTPATIENT)
Facility: HOSPITAL | Age: 60
End: 2025-03-26
Payer: COMMERCIAL

## 2025-03-26 NOTE — H&P
[facility]  [address]  [facility] [address]  Tel:  [phone]  Fax: [phone]    Patient :  Rhina Ibarra  YOB: 1965  Birth Sex:  Female  Date:   03/20/2025 8:00 AM   Visit Type:    Office Visit  Assessment/Plan  #  Detail Type  Description   1.  Assessment  Urge incontinence (N39.41).    Patient Plan  History of InterStim placement 5/12/16 which is no longer effective. She is scheduled for InterStim removal and Axonics stage 1 and 2 placement. 3/27/25.      Rediscussed the procedure in detail. Risks including pain, infection, bleeding, and lead fracture were reviewed. Urine C&S sent.  Rx for postop medications-cephalexin 500 mg q.8 hours x7 days, and tramadol 50 mg p.r.n. sent to pharmacy.  Follow-up as scheduled post-op.         2.  Assessment  Dysuria (R30.0).    Plan Orders  The patient had the following order(s): UA In Office No Micro. Obtained on 03/20/2025, Interpretation: See module and Urine Culture, Routine to be performed. Obtained on 03/20/2025.         3.  Assessment  Frequency of micturition (R35.0).         4.  Assessment  Feeling of incomplete bladder emptying (R39.14).         5.  Assessment  Urgency of urination (R39.15).         6.  Assessment  Nocturia (R35.1).              Additional Visit Information    This 59 year old patient presents for Kidney and/or Ureteral Stone and Overactive Bladder.    History of Present Illness  1.  Kidney and/or Ureteral Stone   Onset was gradual. It occurs intermittently. The problem is improving. Prior stone type of unknown. Pertinent history includes history of prior stone(s). Associated symptoms include urinary frequency. Pertinent negatives include chills, constipation, diarrhea, fever, nausea and vomiting. Additional information: Pt w long hx of stones.  She began having pain 1/1/2016 she passed 5 stones last year.  SHe is still having pelvic pressure.  She had gross hematuria as well.  A renal US was  Death  Condition  Onset Age  Cause of Death          Family history of sinus disease    N  Father        Allergies    N  Father        Heart disease    N    Social History  (Reviewed, updated)  Tobacco use reviewed.    Preferred language is English.      Education/Employment/Occupation  Employment  History  Status  Retired  Restrictions                      Marital Status/Family/Social Support  Marital status:     Tobacco use status: Never smoked tobacco.    Smoking status: Never smoker.    Tobacco Screening  Patient has never used tobacco. Patient has not used tobacco in the last 30 days. Patient has not used smokeless tobacco in the last 30 days.    Smoking Status  Type  Smoking Status  Usage Per Day  Years Used  Pack Years  Total Pack Years    Never smoker            Alcohol  There is no history of alcohol use.     Caffeine  The patient uses caffeine: coffee and soda. - 1 cup a day.    Lifestyle  Sedentary activity level.    Sleep Patterns  Patient has no changes to sleep patterns.      Review of Systems  System  Neg/Pos  Details  Constitutional  Negative  Chills and Fever.  ENMT  Negative  Ear infections and Sore throat.  Eyes  Negative  Blurred vision, Double vision and Eye pain.  Respiratory  Negative  Asthma, Chronic cough, Dyspnea and Wheezing.  Cardio  Negative  Chest pain.  GI  Negative  Constipation, Decreased appetite, Diarrhea, Nausea and Vomiting.    Positive  Nocturia, Urgency, Urinary frequency.    Negative  Slow stream.  Endocrine  Negative  Cold intolerance, Heat intolerance, Increased thirst and Weight loss.  Neuro  Negative  Headache and Tremors.  Psych  Negative  Anxiety and Depression.  Integumentary  Negative  Itching skin and Rash.  MS  Negative  Back pain and Joint pain.  Hema/Lymph  Negative  Easy bleeding.      Vital Signs  Height  Time  ft  in  cm  Last Measured  Height Position   8:57

## 2025-03-27 ENCOUNTER — HOSPITAL ENCOUNTER (OUTPATIENT)
Facility: HOSPITAL | Age: 60
Setting detail: OUTPATIENT SURGERY
Discharge: HOME | End: 2025-03-27
Attending: UROLOGY | Admitting: UROLOGY
Payer: COMMERCIAL

## 2025-03-27 ENCOUNTER — APPOINTMENT (OUTPATIENT)
Facility: HOSPITAL | Age: 60
End: 2025-03-27
Attending: UROLOGY
Payer: COMMERCIAL

## 2025-03-27 ENCOUNTER — ANESTHESIA (OUTPATIENT)
Facility: HOSPITAL | Age: 60
End: 2025-03-27
Payer: COMMERCIAL

## 2025-03-27 VITALS
HEIGHT: 60 IN | WEIGHT: 186.1 LBS | HEART RATE: 86 BPM | BODY MASS INDEX: 36.53 KG/M2 | DIASTOLIC BLOOD PRESSURE: 65 MMHG | OXYGEN SATURATION: 92 % | TEMPERATURE: 97.6 F | SYSTOLIC BLOOD PRESSURE: 131 MMHG | RESPIRATION RATE: 16 BRPM

## 2025-03-27 LAB
GLUCOSE BLD STRIP.AUTO-MCNC: 71 MG/DL (ref 70–110)
GLUCOSE BLD STRIP.AUTO-MCNC: 79 MG/DL (ref 70–110)

## 2025-03-27 PROCEDURE — 3600000012 HC SURGERY LEVEL 2 ADDTL 15MIN: Performed by: UROLOGY

## 2025-03-27 PROCEDURE — 6360000002 HC RX W HCPCS: Performed by: UROLOGY

## 2025-03-27 PROCEDURE — 77002 NEEDLE LOCALIZATION BY XRAY: CPT

## 2025-03-27 PROCEDURE — 3700000001 HC ADD 15 MINUTES (ANESTHESIA): Performed by: UROLOGY

## 2025-03-27 PROCEDURE — 7100000000 HC PACU RECOVERY - FIRST 15 MIN: Performed by: UROLOGY

## 2025-03-27 PROCEDURE — 7100000010 HC PHASE II RECOVERY - FIRST 15 MIN: Performed by: UROLOGY

## 2025-03-27 PROCEDURE — 3700000000 HC ANESTHESIA ATTENDED CARE: Performed by: UROLOGY

## 2025-03-27 PROCEDURE — 2580000003 HC RX 258: Performed by: UROLOGY

## 2025-03-27 PROCEDURE — 2580000003 HC RX 258: Performed by: NURSE ANESTHETIST, CERTIFIED REGISTERED

## 2025-03-27 PROCEDURE — 2500000003 HC RX 250 WO HCPCS: Performed by: UROLOGY

## 2025-03-27 PROCEDURE — 6360000002 HC RX W HCPCS: Performed by: NURSE ANESTHETIST, CERTIFIED REGISTERED

## 2025-03-27 PROCEDURE — 3600000002 HC SURGERY LEVEL 2 BASE: Performed by: UROLOGY

## 2025-03-27 PROCEDURE — 2709999900 HC NON-CHARGEABLE SUPPLY: Performed by: UROLOGY

## 2025-03-27 PROCEDURE — 82962 GLUCOSE BLOOD TEST: CPT

## 2025-03-27 PROCEDURE — 7100000001 HC PACU RECOVERY - ADDTL 15 MIN: Performed by: UROLOGY

## 2025-03-27 PROCEDURE — 7100000011 HC PHASE II RECOVERY - ADDTL 15 MIN: Performed by: UROLOGY

## 2025-03-27 RX ORDER — SODIUM CHLORIDE 9 MG/ML
INJECTION, SOLUTION INTRAVENOUS PRN
Status: DISCONTINUED | OUTPATIENT
Start: 2025-03-27 | End: 2025-03-27 | Stop reason: HOSPADM

## 2025-03-27 RX ORDER — HYDROMORPHONE HYDROCHLORIDE 1 MG/ML
0.5 INJECTION, SOLUTION INTRAMUSCULAR; INTRAVENOUS; SUBCUTANEOUS EVERY 5 MIN PRN
Status: DISCONTINUED | OUTPATIENT
Start: 2025-03-27 | End: 2025-03-27 | Stop reason: HOSPADM

## 2025-03-27 RX ORDER — NALOXONE HYDROCHLORIDE 0.4 MG/ML
INJECTION, SOLUTION INTRAMUSCULAR; INTRAVENOUS; SUBCUTANEOUS PRN
Status: DISCONTINUED | OUTPATIENT
Start: 2025-03-27 | End: 2025-03-27 | Stop reason: HOSPADM

## 2025-03-27 RX ORDER — MIDAZOLAM HYDROCHLORIDE 1 MG/ML
INJECTION, SOLUTION INTRAMUSCULAR; INTRAVENOUS
Status: DISCONTINUED | OUTPATIENT
Start: 2025-03-27 | End: 2025-03-27 | Stop reason: SDUPTHER

## 2025-03-27 RX ORDER — PROPOFOL 10 MG/ML
INJECTION, EMULSION INTRAVENOUS
Status: DISCONTINUED | OUTPATIENT
Start: 2025-03-27 | End: 2025-03-27 | Stop reason: SDUPTHER

## 2025-03-27 RX ORDER — BUPIVACAINE HYDROCHLORIDE 2.5 MG/ML
INJECTION, SOLUTION EPIDURAL; INFILTRATION; INTRACAUDAL; PERINEURAL PRN
Status: DISCONTINUED | OUTPATIENT
Start: 2025-03-27 | End: 2025-03-27 | Stop reason: ALTCHOICE

## 2025-03-27 RX ORDER — LIDOCAINE HYDROCHLORIDE 20 MG/ML
INJECTION, SOLUTION INTRAVENOUS
Status: DISCONTINUED | OUTPATIENT
Start: 2025-03-27 | End: 2025-03-27 | Stop reason: SDUPTHER

## 2025-03-27 RX ORDER — OXYCODONE HYDROCHLORIDE 5 MG/1
10 TABLET ORAL PRN
Status: DISCONTINUED | OUTPATIENT
Start: 2025-03-27 | End: 2025-03-27 | Stop reason: HOSPADM

## 2025-03-27 RX ORDER — LABETALOL HYDROCHLORIDE 5 MG/ML
INJECTION, SOLUTION INTRAVENOUS
Status: DISCONTINUED | OUTPATIENT
Start: 2025-03-27 | End: 2025-03-27 | Stop reason: SDUPTHER

## 2025-03-27 RX ORDER — FENTANYL CITRATE 50 UG/ML
25 INJECTION, SOLUTION INTRAMUSCULAR; INTRAVENOUS EVERY 5 MIN PRN
Status: DISCONTINUED | OUTPATIENT
Start: 2025-03-27 | End: 2025-03-27 | Stop reason: HOSPADM

## 2025-03-27 RX ORDER — PROCHLORPERAZINE EDISYLATE 5 MG/ML
5 INJECTION INTRAMUSCULAR; INTRAVENOUS
Status: DISCONTINUED | OUTPATIENT
Start: 2025-03-27 | End: 2025-03-27 | Stop reason: HOSPADM

## 2025-03-27 RX ORDER — IPRATROPIUM BROMIDE AND ALBUTEROL SULFATE 2.5; .5 MG/3ML; MG/3ML
1 SOLUTION RESPIRATORY (INHALATION)
Status: DISCONTINUED | OUTPATIENT
Start: 2025-03-27 | End: 2025-03-27 | Stop reason: HOSPADM

## 2025-03-27 RX ORDER — SUCCINYLCHOLINE/SOD CL,ISO/PF 100 MG/5ML
SYRINGE (ML) INTRAVENOUS
Status: DISCONTINUED | OUTPATIENT
Start: 2025-03-27 | End: 2025-03-27 | Stop reason: SDUPTHER

## 2025-03-27 RX ORDER — ONDANSETRON 2 MG/ML
INJECTION INTRAMUSCULAR; INTRAVENOUS
Status: DISCONTINUED | OUTPATIENT
Start: 2025-03-27 | End: 2025-03-27 | Stop reason: SDUPTHER

## 2025-03-27 RX ORDER — SODIUM CHLORIDE, SODIUM LACTATE, POTASSIUM CHLORIDE, CALCIUM CHLORIDE 600; 310; 30; 20 MG/100ML; MG/100ML; MG/100ML; MG/100ML
INJECTION, SOLUTION INTRAVENOUS
Status: DISCONTINUED | OUTPATIENT
Start: 2025-03-27 | End: 2025-03-27 | Stop reason: SDUPTHER

## 2025-03-27 RX ORDER — SODIUM CHLORIDE 9 MG/ML
INJECTION, SOLUTION INTRAVENOUS CONTINUOUS
Status: DISCONTINUED | OUTPATIENT
Start: 2025-03-27 | End: 2025-03-27 | Stop reason: HOSPADM

## 2025-03-27 RX ORDER — SODIUM CHLORIDE 0.9 % (FLUSH) 0.9 %
5-40 SYRINGE (ML) INJECTION EVERY 12 HOURS SCHEDULED
Status: DISCONTINUED | OUTPATIENT
Start: 2025-03-27 | End: 2025-03-27 | Stop reason: HOSPADM

## 2025-03-27 RX ORDER — SODIUM CHLORIDE 0.9 % (FLUSH) 0.9 %
5-40 SYRINGE (ML) INJECTION PRN
Status: DISCONTINUED | OUTPATIENT
Start: 2025-03-27 | End: 2025-03-27 | Stop reason: HOSPADM

## 2025-03-27 RX ORDER — FENTANYL CITRATE 50 UG/ML
INJECTION, SOLUTION INTRAMUSCULAR; INTRAVENOUS
Status: DISCONTINUED | OUTPATIENT
Start: 2025-03-27 | End: 2025-03-27 | Stop reason: SDUPTHER

## 2025-03-27 RX ORDER — OXYCODONE HYDROCHLORIDE 5 MG/1
5 TABLET ORAL PRN
Status: DISCONTINUED | OUTPATIENT
Start: 2025-03-27 | End: 2025-03-27 | Stop reason: HOSPADM

## 2025-03-27 RX ADMIN — SODIUM CHLORIDE, SODIUM LACTATE, POTASSIUM CHLORIDE, AND CALCIUM CHLORIDE: 600; 310; 30; 20 INJECTION, SOLUTION INTRAVENOUS at 10:03

## 2025-03-27 RX ADMIN — Medication 2000 MG: at 10:13

## 2025-03-27 RX ADMIN — Medication 100 MG: at 10:08

## 2025-03-27 RX ADMIN — FENTANYL CITRATE 100 MCG: 50 INJECTION INTRAMUSCULAR; INTRAVENOUS at 10:08

## 2025-03-27 RX ADMIN — ONDANSETRON HYDROCHLORIDE 4 MG: 2 INJECTION INTRAMUSCULAR; INTRAVENOUS at 10:28

## 2025-03-27 RX ADMIN — PROPOFOL 150 MG: 10 INJECTION, EMULSION INTRAVENOUS at 10:08

## 2025-03-27 RX ADMIN — LABETALOL HYDROCHLORIDE 10 MG: 5 INJECTION, SOLUTION INTRAVENOUS at 10:34

## 2025-03-27 RX ADMIN — LIDOCAINE HYDROCHLORIDE 40 MG: 20 INJECTION, SOLUTION INTRAVENOUS at 10:08

## 2025-03-27 RX ADMIN — MIDAZOLAM 2 MG: 1 INJECTION INTRAMUSCULAR; INTRAVENOUS at 10:03

## 2025-03-27 RX ADMIN — SODIUM CHLORIDE: 0.9 INJECTION, SOLUTION INTRAVENOUS at 07:59

## 2025-03-27 ASSESSMENT — PAIN SCALES - GENERAL
PAINLEVEL_OUTOF10: 0
PAINLEVEL_OUTOF10: 3

## 2025-03-27 ASSESSMENT — PAIN - FUNCTIONAL ASSESSMENT
PAIN_FUNCTIONAL_ASSESSMENT: PREVENTS OR INTERFERES SOME ACTIVE ACTIVITIES AND ADLS
PAIN_FUNCTIONAL_ASSESSMENT: 0-10

## 2025-03-27 ASSESSMENT — PAIN DESCRIPTION - DESCRIPTORS: DESCRIPTORS: DULL

## 2025-03-27 ASSESSMENT — LIFESTYLE VARIABLES: SMOKING_STATUS: 0

## 2025-03-27 ASSESSMENT — PAIN DESCRIPTION - LOCATION: LOCATION: BACK

## 2025-03-27 NOTE — PERIOP NOTE
Reviewed PTA medication list with patient/caregiver and patient/caregiver denies any additional medications.     Patient admits to having a responsible adult care for them at home for at least 24 hours after surgery.    Patient encouraged to use gown warming system and informed that using said warming gown to regulate body temperature prior to a procedure has been shown to help reduce the risks of blood clots and infection.    Patient's pharmacy of choice verified and documented in PTA medication section.    Dual skin assessment & fall risk band verification completed with Carolina CABRERA.

## 2025-03-27 NOTE — ANESTHESIA PRE PROCEDURE
Department of Anesthesiology  Preprocedure Note       Name:  Rhina Ibarra   Age:  59 y.o.  :  1965                                          MRN:  [id number]         Date:  3/27/2025      Surgeon: Surgeon(s):  Rory Martínez MD    Procedure: Procedure(s):  REMOVE INTERSTIM  WITH C-ARM \"SPEC POP\"    Medications prior to admission:   Prior to Admission medications    Medication Sig Start Date End Date Taking? Authorizing Provider   montelukast (SINGULAIR) 10 MG tablet Take 1 tablet by mouth nightly   Yes Rodrigo Sampson MD   lisinopril (PRINIVIL;ZESTRIL) 10 MG tablet Take 1 tablet by mouth nightly   Yes Rodrigo Sampson MD   metoclopramide (REGLAN) 10 MG tablet Take 1 tablet by mouth 2 times daily   Yes Rodrigo Sampson MD   DULoxetine (CYMBALTA) 30 MG extended release capsule Take 1 capsule by mouth 2 times daily   Yes Rodrigo Sampson MD   omeprazole (PRILOSEC) 20 MG delayed release capsule Take 1 capsule by mouth 2 times daily   Yes Rodrigo Sampson MD   rosuvastatin (CRESTOR) 5 MG tablet Take 1 tablet by mouth nightly   Yes Rodrigo Sampson MD   albuterol sulfate HFA (VENTOLIN HFA) 108 (90 Base) MCG/ACT inhaler Inhale 2 puffs into the lungs every 4 hours as needed for Wheezing   Yes Rodrigo Sampson MD   insulin regular human (HUMULIN R) 500 UNIT/ML concentrated injection vial Inject into the skin Insulin pump    Rodrigo Sampson MD   vitamin D (CHOLECALCIFEROL) 25 MCG (1000 UT) TABS tablet Take 1 tablet by mouth daily    Rodrigo Sampson MD   vitamin C (ASCORBIC ACID) 500 MG tablet Take 1 tablet by mouth daily    Rodrigo Sampson MD       Current medications:    Current Facility-Administered Medications   Medication Dose Route Frequency Provider Last Rate Last Admin   • 0.9 % sodium chloride infusion   IntraVENous Continuous Rory Martínez  mL/hr at 25 0759 New Bag at 25 0759   • ceFAZolin (ANCEF) 2000 mg in sterile water 20 mL IV

## 2025-03-27 NOTE — PERIOP NOTE
TRANSFER - IN REPORT:    Verbal report received from Keira  on Rhina Ibarra  being received from Phase 2  for routine progression of patient care      Report consisted of patient's Situation, Background, Assessment and   Recommendations(SBAR).     Information from the following report(s) Adult Overview, Surgery Report, Intake/Output, MAR, and Recent Results was reviewed with the receiving nurse.    Opportunity for questions and clarification was provided.      Assessment completed upon patient's arrival to unit and care assumed.

## 2025-03-27 NOTE — PERIOP NOTE
Intake/Output Summary (Last 24 hours) at 3/27/2025 1227  Last data filed at 3/27/2025 1200  Gross per 24 hour   Intake 1760 ml   Output --   Net 1760 ml

## 2025-03-27 NOTE — PERIOP NOTE
Dr. Menendez aware of patients platelet 142, ok to proceed at this time. No repeat lab work ordered at this time.

## 2025-03-27 NOTE — DISCHARGE INSTRUCTIONS
Rory Martínez M.D.  [facility]  [address], [address], [place] [address]  Office: [phone]  Fax:    [phone]    PROCEDURE: Procedure(s):  REMOVE INTERSTIM  WITH C-ARM \"SPEC POP\"    Notify [facility] IMMEDIATELY if any of the following occur:    You are unable to urinate.  Urgency to urinate is not uncommon.  You find yourself urinating small frequent amounts associated with severe lower abdominal discomfort.  Bright red blood with clots in the urine. Some reddish urine is not uncommon and should be treated with increasing the amount of fluids you drink.  Temperature above 101.5° and / or chills.  You are nauseous and / or vomiting and you cannot hold down any fluids.  Your pain is not controlled with the pain medication prescribed.    Special Considerations:     Do not drive for at least 24 hours after the procedure and until you are no longer taking narcotic pain medication and you are able to move and react without hesitation.      MEDICATIONS:  Pain   []  Norco®   []  Percocet®  [] Dilaudid®    []  Tramadol  [] Ketorolac   Antibiotics   []  Cipro   []  Keflex    [] Levaquin   []  Bactrim DS®      [] Cefuroxime   Urination   []  Vesicare®   []  Flomax      Burning   []  Pyridium®   []  UribelTM      Nausea   []  Zofran®   []  Phenergan®      Miscellaneous   []            [] Prescriptions Written on Chart    [x] Prescriptions sent Electronically prior to surgery           Our office will call you tomorrow to schedule your first follow-up appointment.    Please contact [facility] at [phone] or go to the nearest Emergency Department / Urgent Care facility for any other medical questions or concerns.      DISCHARGE SUMMARY from Nurse    PATIENT INSTRUCTIONS:    After general anesthesia or intravenous sedation, for 24 hours or while taking prescription Narcotics:  Limit your activities  Do not drive and operate hazardous machinery  Do not make important personal or

## 2025-03-27 NOTE — ANESTHESIA POSTPROCEDURE EVALUATION
Department of Anesthesiology  Postprocedure Note    Patient: Rhina Ibarra  MRN: [id number]  YOB: 1965  Date of evaluation: 3/27/2025    Procedure Summary       Date: 03/27/25 Room / Location: [facility] MAIN 05 / [facility] MAIN OR    Anesthesia Start: 1003 Anesthesia Stop: 1050    Procedure: REMOVE INTERSTIM  WITH C-ARM \"SPEC POP\" (Rectum) Diagnosis:       Mechanical breakdown of urinary electronic stimulator device, initial encounter      (Mechanical breakdown of urinary electronic stimulator device, initial encounter [T83.110A])    Surgeons: Rory Martínez MD Responsible Provider: Sunday Menendez MD    Anesthesia Type: General ASA Status: 3            Anesthesia Type: General    Perla Phase I: Perla Score: 8    Perla Phase II: Perla Score: 10    Anesthesia Post Evaluation    Patient location during evaluation: PACU  Patient participation: complete - patient participated  Level of consciousness: awake and alert  Pain score: 0  Airway patency: patent  Nausea & Vomiting: no nausea and no vomiting  Cardiovascular status: blood pressure returned to baseline  Respiratory status: acceptable  Hydration status: euvolemic  Multimodal analgesia pain management approach  Pain management: adequate    No notable events documented.

## 2025-03-27 NOTE — PERIOP NOTE
TRANSFER - OUT REPORT:    Verbal report given to JENN CABRERA on Rhina Ibarra  being transferred to PHASE 2 for routine post-op       Report consisted of patient's Situation, Background, Assessment and   Recommendations(SBAR).     Information from the following report(s) Nurse Handoff Report, Surgery Report, Intake/Output, MAR, and Recent Results was reviewed with the receiving nurse.           Lines:   Peripheral IV 03/27/25 Right;Posterior Hand (Active)   Site Assessment Clean, dry & intact 03/27/25 1115   Line Status Infusing 03/27/25 1115   Line Care Connections checked and tightened 03/27/25 0758   Phlebitis Assessment No symptoms 03/27/25 1115   Infiltration Assessment 0 03/27/25 1115   Alcohol Cap Used No 03/27/25 0758   Dressing Status Clean, dry & intact 03/27/25 1115   Dressing Type Transparent 03/27/25 1115   Dressing Intervention New 03/27/25 0758        Opportunity for questions and clarification was provided.      Patient transported with:  Tech  CPAP      Intake/Output Summary (Last 24 hours) at 3/27/2025 1137  Last data filed at 3/27/2025 1124  Gross per 24 hour   Intake 1060 ml   Output --   Net 1060 ml

## 2025-03-27 NOTE — OP NOTE
Operative Note      Patient: Rhina Ibarra  YOB: 1965  MRN: [id number]    Date of Procedure: 3/27/2025    Pre-Op Diagnosis Codes:      * Mechanical breakdown of urinary electronic stimulator device, initial encounter [T83.110A]    Post-Op Diagnosis: Same       Procedure(s):  REMOVE INTERSTIM  WITH C-ARM \"SPEC POP\"    Surgeon(s):  Rory Martínez MD    Assistant:   Surgical Assistant: Orin Washington    Anesthesia: General    Estimated Blood Loss (mL): Minimal    Complications: None    Specimens:   * No specimens in log *    Implants:  * No implants in log *      Drains: * No LDAs found *    Findings:  Infection Present At Time Of Surgery (PATOS) (choose all levels that have infection present):  No infection present      Detailed Description of Procedure:     Informed consent was obtained, the patient taken to the operating room and after administration of general anesthesia on OR stretcher, she was flipped in the prone position. All pressure pints were secured.  The patient was prepped and draped in usual surgical fashion.  At this point the skin over the old device on the left side was anesthetized with 0.25% Marcaine: Using a 15 blade I made an incision over the old device then using bovie cautery I dissected the device free. I used the supplied screw  and removed the old device from the permanent lead.  I then dissected the lead medially to the insertion site near the midline. I then used a 15 blade to make a small incision over the insertion site and with the aid of fluoroscopy, I grasped the lead with a right angle clamp and nisha it out of the incision.   I was able to remove the lead completely by using gentle traction in a cephalad manner. The wound was irrigated and a saline soaked gauze was placed in the wound.   The wound was closed in 2 layers; a running 3-0 Vicryl for Poornima’s layer, and a running subcuticular 4-0 Vicryl for the skin. All layers were irrigated in between.

## 2025-03-27 NOTE — INTERVAL H&P NOTE
Update History & Physical    The patient's History and Physical of March 20, 2025 was reviewed with the patient and I examined the patient. There was no change. The surgical site was confirmed by the patient and me.     Plan: The risks, benefits, expected outcome, and alternative to the recommended procedure have been discussed with the patient. Patient understands and wants to proceed with the procedure.     Electronically signed by Rory Martínez MD on 3/27/2025 at 8:38 AM

## (undated) DEVICE — Device

## (undated) DEVICE — DRAPE C ARM UNIV W41XL74IN CLR PLAS XR VELC CLSR POLY STRP

## (undated) DEVICE — MASTISOL ADHESIVE LIQ 2/3ML

## (undated) DEVICE — SUTURE VCRL + SZ 2-0 L18IN ABSRB VLT CT-2 1/2 CIR TAPERCUT VCP726D

## (undated) DEVICE — C-ARMOR C-ARM EQUIPMENT COVERS CLEAR STERILE UNIVERSAL FIT 12 PER CASE: Brand: C-ARMOR

## (undated) DEVICE — ELECTRODE PT RET AD L9FT HI MOIST COND ADH HYDRGEL CORDED

## (undated) DEVICE — PACK PROCEDURE SURG LAMINECTOMY SPINE CUST

## (undated) DEVICE — GLOVE ORANGE PI 7 1/2   MSG9075

## (undated) DEVICE — INTENDED FOR TISSUE SEPARATION, AND OTHER PROCEDURES THAT REQUIRE A SHARP SURGICAL BLADE TO PUNCTURE OR CUT.: Brand: BARD-PARKER ® CARBON RIB-BACK BLADES

## (undated) DEVICE — 3M™ TEGADERM™ TRANSPARENT FILM DRESSING FRAME STYLE, 1626W, 4 IN X 4-3/4 IN (10 CM X 12 CM), 50/CT 4CT/CASE: Brand: 3M™ TEGADERM™

## (undated) DEVICE — HANDPIECE SET WITH FAN SPRAY TIP: Brand: INTERPULSE

## (undated) DEVICE — SOL IRRIGATION INJ NACL 0.9% 500ML BTL

## (undated) DEVICE — SOLUTION IRRIG 500ML 0.9% SOD CHLO USP POUR PLAS BTL

## (undated) DEVICE — 3M™ STERI-DRAPE™ INCISE DRAPE 1050 (60CM X 45CM): Brand: STERI-DRAPE™

## (undated) DEVICE — ROUND DISSECTORS: Brand: DEROYAL

## (undated) DEVICE — LIQUIBAND RAPID ADHESIVE 36/CS 0.8ML: Brand: MEDLINE

## (undated) DEVICE — STRIP,CLOSURE,WOUND,MEDI-STRIP,1/2X4: Brand: MEDLINE

## (undated) DEVICE — SYRINGE BLB 50CC IRRIG PLIABLE FNGR FLNG GRAD FLSK DISP

## (undated) DEVICE — SYRINGE MED 10ML TRNSLUC BRL PLUNG BLK MRK POLYPR CTRL

## (undated) DEVICE — MINOR: Brand: MEDLINE INDUSTRIES, INC.

## (undated) DEVICE — ABDOMINAL PAD: Brand: DERMACEA

## (undated) DEVICE — REM POLYHESIVE ADULT PATIENT RETURN ELECTRODE: Brand: VALLEYLAB

## (undated) DEVICE — PREP SKN PREVAIL 40ML APPL --

## (undated) DEVICE — 3.0MM PRECISION NEURO (MATCH HEAD)

## (undated) DEVICE — SPONGE GZ W4XL4IN COT 12 PLY TYP VII WVN C FLD DSGN STERILE

## (undated) DEVICE — X-RAY SPONGES,12 PLY: Brand: DERMACEA

## (undated) DEVICE — Z DISCONTINUED USE (MFG CAT 7984-37) SOLUTION IV SODIUM CHL 0.9% 100 ML INJ

## (undated) DEVICE — NON-ADHERENT STRIPS,OIL EMULSION: Brand: CURITY

## (undated) DEVICE — KENDALL SCD EXPRESS SLEEVES, KNEE LENGTH, MEDIUM: Brand: KENDALL SCD

## (undated) DEVICE — DRAIN KT WND 10FR RND 400ML --

## (undated) DEVICE — PENCIL ES L3M ROCK SWCH S STL HEX LOK BLDE ELECTRD HOLSTER

## (undated) DEVICE — SUTURE VICRYL + SZ 3-0 L27IN ABSRB UD L26MM SH 1/2 CIR VCP416H

## (undated) DEVICE — BIT DRL SLD 4.4X60 MM W/ STP DISP

## (undated) DEVICE — SUTURE ABSORBABLE BRAIDED 1-0 OS-8 CR 3X18 IN UD VICRYL J757T

## (undated) DEVICE — SUTURE VICRYL + SZ 4-0 L27IN ABSRB UD PS-2 3/8 CIR REV CUT VCP426H

## (undated) DEVICE — TRAY CATH 16FR DRN BG LF -- CONVERT TO ITEM 363158

## (undated) DEVICE — 1010 S-DRAPE TOWEL DRAPE 10/BX: Brand: STERI-DRAPE™